# Patient Record
Sex: FEMALE | Race: WHITE | Employment: FULL TIME | URBAN - METROPOLITAN AREA
[De-identification: names, ages, dates, MRNs, and addresses within clinical notes are randomized per-mention and may not be internally consistent; named-entity substitution may affect disease eponyms.]

---

## 2019-08-27 ENCOUNTER — HOSPITAL ENCOUNTER (OUTPATIENT)
Dept: PREADMISSION TESTING | Age: 59
Discharge: HOME | End: 2019-08-31
Payer: MEDICARE

## 2019-08-27 VITALS
TEMPERATURE: 97.9 F | BODY MASS INDEX: 31.8 KG/M2 | HEART RATE: 65 BPM | SYSTOLIC BLOOD PRESSURE: 135 MMHG | DIASTOLIC BLOOD PRESSURE: 87 MMHG | RESPIRATION RATE: 24 BRPM | HEIGHT: 62 IN | OXYGEN SATURATION: 99 % | WEIGHT: 172.8 LBS

## 2019-08-27 LAB
ANION GAP SERPL CALCULATED.3IONS-SCNC: 12 MMOL/L (ref 9–17)
BUN BLDV-MCNC: 24 MG/DL (ref 6–20)
CHLORIDE BLD-SCNC: 103 MMOL/L (ref 98–107)
CO2: 26 MMOL/L (ref 20–31)
CREAT SERPL-MCNC: 1.09 MG/DL (ref 0.5–0.9)
GFR AFRICAN AMERICAN: >60 ML/MIN
GFR NON-AFRICAN AMERICAN: 52 ML/MIN
GFR SERPL CREATININE-BSD FRML MDRD: ABNORMAL ML/MIN/{1.73_M2}
GFR SERPL CREATININE-BSD FRML MDRD: ABNORMAL ML/MIN/{1.73_M2}
GLUCOSE BLD-MCNC: 72 MG/DL (ref 70–99)
HCT VFR BLD CALC: 41.6 % (ref 36.3–47.1)
HEMOGLOBIN: 13.2 G/DL (ref 11.9–15.1)
INR BLD: 1
MCH RBC QN AUTO: 27.3 PG (ref 25.2–33.5)
MCHC RBC AUTO-ENTMCNC: 31.7 G/DL (ref 28.4–34.8)
MCV RBC AUTO: 86.1 FL (ref 82.6–102.9)
NRBC AUTOMATED: 0 PER 100 WBC
PARTIAL THROMBOPLASTIN TIME: 24.1 SEC (ref 20.5–30.5)
PDW BLD-RTO: 14.6 % (ref 11.8–14.4)
PLATELET # BLD: 265 K/UL (ref 138–453)
PMV BLD AUTO: 10.4 FL (ref 8.1–13.5)
POTASSIUM SERPL-SCNC: 4.5 MMOL/L (ref 3.7–5.3)
PROTHROMBIN TIME: 10.2 SEC (ref 9–12)
RBC # BLD: 4.83 M/UL (ref 3.95–5.11)
SODIUM BLD-SCNC: 141 MMOL/L (ref 135–144)
WBC # BLD: 5.7 K/UL (ref 3.5–11.3)

## 2019-08-27 PROCEDURE — 82947 ASSAY GLUCOSE BLOOD QUANT: CPT

## 2019-08-27 PROCEDURE — 85027 COMPLETE CBC AUTOMATED: CPT

## 2019-08-27 PROCEDURE — 85730 THROMBOPLASTIN TIME PARTIAL: CPT

## 2019-08-27 PROCEDURE — 87086 URINE CULTURE/COLONY COUNT: CPT

## 2019-08-27 PROCEDURE — 87186 SC STD MICRODIL/AGAR DIL: CPT

## 2019-08-27 PROCEDURE — 93005 ELECTROCARDIOGRAM TRACING: CPT

## 2019-08-27 PROCEDURE — 87088 URINE BACTERIA CULTURE: CPT

## 2019-08-27 PROCEDURE — 84520 ASSAY OF UREA NITROGEN: CPT

## 2019-08-27 PROCEDURE — 85610 PROTHROMBIN TIME: CPT

## 2019-08-27 PROCEDURE — 36415 COLL VENOUS BLD VENIPUNCTURE: CPT

## 2019-08-27 PROCEDURE — 80051 ELECTROLYTE PANEL: CPT

## 2019-08-27 PROCEDURE — 82565 ASSAY OF CREATININE: CPT

## 2019-08-27 RX ORDER — BUSPIRONE HYDROCHLORIDE 15 MG/1
15 TABLET ORAL 2 TIMES DAILY
COMMUNITY
Start: 2017-02-20

## 2019-08-27 RX ORDER — BUPROPION HYDROCHLORIDE 75 MG/1
150 TABLET ORAL 2 TIMES DAILY
COMMUNITY
Start: 2017-02-20

## 2019-08-27 RX ORDER — ATORVASTATIN CALCIUM 40 MG/1
40 TABLET, FILM COATED ORAL DAILY
COMMUNITY
Start: 2017-02-20

## 2019-08-27 RX ORDER — ARIPIPRAZOLE 5 MG/1
5 TABLET ORAL DAILY
COMMUNITY
Start: 2017-02-20

## 2019-08-27 RX ORDER — AMLODIPINE BESYLATE 5 MG/1
5 TABLET ORAL DAILY
COMMUNITY
Start: 2017-02-20

## 2019-08-27 RX ORDER — OMEPRAZOLE 20 MG/1
40 CAPSULE, DELAYED RELEASE ORAL DAILY
COMMUNITY

## 2019-08-27 RX ORDER — FLUTICASONE PROPIONATE 50 MCG
1 SPRAY, SUSPENSION (ML) NASAL 2 TIMES DAILY
COMMUNITY

## 2019-08-27 RX ORDER — SODIUM CHLORIDE, SODIUM LACTATE, POTASSIUM CHLORIDE, CALCIUM CHLORIDE 600; 310; 30; 20 MG/100ML; MG/100ML; MG/100ML; MG/100ML
1000 INJECTION, SOLUTION INTRAVENOUS CONTINUOUS
Status: CANCELLED | OUTPATIENT
Start: 2019-08-27

## 2019-08-27 NOTE — ANESTHESIA PRE-OP
No    Medical or cardiac clearance ordered:                                         No    Anesthesiologist called:                                                                No    MICHELLE Fitzgerald PA-C  Electronically signed 8/27/2019 at 9:51 AM

## 2019-08-27 NOTE — H&P (VIEW-ONLY)
History and Physical    Pt Name: Lanette Yanez  MRN: [id number]  YOB: 1960  Date of evaluation: 2019  Primary Care Physician: Mack Hensley  Patient evaluated at the request of  Dr. Shmuel Cheng     Reason for evaluation:  LEFT   Kidney stone   SUBJECTIVE:   History of Chief Complaint:      Linda Lambert is a 63 y.o. female   Who was dx with her first kidney stone in 2018  , had a routine  Physical  Exam  with a abdominal x-ray which  Showed a  incidental finding of a LEFT  kidney stone that  is approx 4 cm ,denies any gross hematuria , dysuria and  flank pain. Also denies drinking soda . Past Medical History      has a past medical history of Accelerated essential hypertension, Arthritis, Cancer ([address] Depression, and Hyperlipidemia. Past Surgical History   has a past surgical history that includes Mandible fracture surgery (Bilateral); Ectopic pregnancy surgery; Carpal tunnel release (Bilateral); Dilation and curettage of uterus;  section; Hysterectomy (); joint replacement (Left, ); Colonoscopy; and skin biopsy (Left, ). Medications   Scheduled Meds:  Continuous Infusions:  PRN Meds:. Allergies  is allergic to latex. Family History    family history includes Arthritis in her father and mother; Cancer (age of onset: 48) in her maternal aunt; Depression (age of onset: 25) in an other family member; Diabetes in her mother; High Blood Pressure in her mother; Mental Illness in an other family member; Stroke (age of onset: 79) in her father.     Family Status   Relation Name Status    Mother  Alive    Father     [address] Other  (Not Specified)        SISTER, LEFT BREAST LUMP REMOVED         Social History  Social History     Socioeconomic History    Marital status:      Spouse name: Not on file    Number of children: Not on file    Years of education: Not on file    Highest education level: Not on file   Occupational reveals no adventitious breath sounds. CARDIOVASCULAR: \"Heart sounds are normal.  Regular rate and rhythm without murmur,    ABDOMEN: Bowel sounds are present in all four quadrants      GENATALIA:Deferred. NEUROLOGIC: \"CN II-XII are grossly intact. EXTREMITIES: Pitting edema:  No,  Varicose veins: No     Dorsal pedal/posterior tibial pulses palpable: Yes         Strength:  Normal       There is no problem list on file for this patient. IMPRESSIONS:   1.   Kidney stone   does not have a problem list on file.     [address]  Electronically signed 8/27/2019 at 10:56 AM       Scheduled for:   Cysto ,  LEFT  kidney stone surgery

## 2019-08-28 LAB
CULTURE: ABNORMAL
Lab: ABNORMAL
SPECIMEN DESCRIPTION: ABNORMAL

## 2019-08-30 LAB
EKG ATRIAL RATE: 63 BPM
EKG P AXIS: 13 DEGREES
EKG P-R INTERVAL: 166 MS
EKG Q-T INTERVAL: 420 MS
EKG QRS DURATION: 100 MS
EKG QTC CALCULATION (BAZETT): 429 MS
EKG R AXIS: 15 DEGREES
EKG T AXIS: -20 DEGREES
EKG VENTRICULAR RATE: 63 BPM

## 2019-09-10 ENCOUNTER — HOSPITAL ENCOUNTER (INPATIENT)
Age: 59
LOS: 1 days | Discharge: HOME | DRG: 443 | End: 2019-09-13
Attending: UROLOGY | Admitting: UROLOGY
Payer: MEDICARE

## 2019-09-10 ENCOUNTER — ANESTHESIA (OUTPATIENT)
Dept: OPERATING ROOM | Age: 59
DRG: 443 | End: 2019-09-10
Payer: MEDICARE

## 2019-09-10 ENCOUNTER — ANESTHESIA (OUTPATIENT)
Dept: INTERVENTIONAL RADIOLOGY/VASCULAR | Age: 59
DRG: 443 | End: 2019-09-10
Payer: MEDICARE

## 2019-09-10 ENCOUNTER — ANESTHESIA EVENT (OUTPATIENT)
Dept: OPERATING ROOM | Age: 59
DRG: 443 | End: 2019-09-10
Payer: MEDICARE

## 2019-09-10 ENCOUNTER — APPOINTMENT (OUTPATIENT)
Dept: CT IMAGING | Age: 59
DRG: 443 | End: 2019-09-10
Attending: UROLOGY
Payer: MEDICARE

## 2019-09-10 ENCOUNTER — APPOINTMENT (OUTPATIENT)
Dept: GENERAL RADIOLOGY | Age: 59
DRG: 443 | End: 2019-09-10
Attending: UROLOGY
Payer: MEDICARE

## 2019-09-10 ENCOUNTER — ANESTHESIA EVENT (OUTPATIENT)
Dept: INTERVENTIONAL RADIOLOGY/VASCULAR | Age: 59
DRG: 443 | End: 2019-09-10
Payer: MEDICARE

## 2019-09-10 ENCOUNTER — HOSPITAL ENCOUNTER (OUTPATIENT)
Dept: INTERVENTIONAL RADIOLOGY/VASCULAR | Age: 59
Discharge: HOME | DRG: 443 | End: 2019-09-12
Payer: MEDICARE

## 2019-09-10 VITALS — DIASTOLIC BLOOD PRESSURE: 70 MMHG | OXYGEN SATURATION: 98 % | SYSTOLIC BLOOD PRESSURE: 119 MMHG

## 2019-09-10 VITALS
DIASTOLIC BLOOD PRESSURE: 77 MMHG | OXYGEN SATURATION: 99 % | SYSTOLIC BLOOD PRESSURE: 111 MMHG | RESPIRATION RATE: 14 BRPM

## 2019-09-10 VITALS
OXYGEN SATURATION: 96 % | SYSTOLIC BLOOD PRESSURE: 142 MMHG | RESPIRATION RATE: 14 BRPM | TEMPERATURE: 92.3 F | DIASTOLIC BLOOD PRESSURE: 93 MMHG

## 2019-09-10 DIAGNOSIS — G89.18 POST-OPERATIVE PAIN: Primary | ICD-10-CM

## 2019-09-10 LAB
ANION GAP SERPL CALCULATED.3IONS-SCNC: 10 MMOL/L (ref 9–17)
BUN BLDV-MCNC: 17 MG/DL (ref 6–20)
BUN BLDV-MCNC: 19 MG/DL (ref 6–20)
BUN/CREAT BLD: ABNORMAL (ref 9–20)
CALCIUM SERPL-MCNC: 8.5 MG/DL (ref 8.6–10.4)
CHLORIDE BLD-SCNC: 104 MMOL/L (ref 98–107)
CO2: 25 MMOL/L (ref 20–31)
CREAT SERPL-MCNC: 1.03 MG/DL (ref 0.5–0.9)
CREAT SERPL-MCNC: 1.06 MG/DL (ref 0.5–0.9)
GFR AFRICAN AMERICAN: >60 ML/MIN
GFR AFRICAN AMERICAN: >60 ML/MIN
GFR NON-AFRICAN AMERICAN: 53 ML/MIN
GFR NON-AFRICAN AMERICAN: 55 ML/MIN
GFR SERPL CREATININE-BSD FRML MDRD: ABNORMAL ML/MIN/{1.73_M2}
GLUCOSE BLD-MCNC: 130 MG/DL (ref 70–99)
HCT VFR BLD CALC: 40.9 % (ref 36.3–47.1)
HEMOGLOBIN: 12.6 G/DL (ref 11.9–15.1)
INR BLD: 1
MCH RBC QN AUTO: 26.8 PG (ref 25.2–33.5)
MCHC RBC AUTO-ENTMCNC: 30.8 G/DL (ref 28.4–34.8)
MCV RBC AUTO: 87 FL (ref 82.6–102.9)
NRBC AUTOMATED: 0 PER 100 WBC
PARTIAL THROMBOPLASTIN TIME: 24.8 SEC (ref 20.5–30.5)
PDW BLD-RTO: 15.1 % (ref 11.8–14.4)
PLATELET # BLD: 237 K/UL (ref 138–453)
PMV BLD AUTO: 10.1 FL (ref 8.1–13.5)
POTASSIUM SERPL-SCNC: 4.1 MMOL/L (ref 3.7–5.3)
PROTHROMBIN TIME: 10.7 SEC (ref 9–12)
RBC # BLD: 4.7 M/UL (ref 3.95–5.11)
SODIUM BLD-SCNC: 139 MMOL/L (ref 135–144)
WBC # BLD: 11.4 K/UL (ref 3.5–11.3)

## 2019-09-10 PROCEDURE — 85610 PROTHROMBIN TIME: CPT

## 2019-09-10 PROCEDURE — 6360000002 HC RX W HCPCS: Performed by: SPECIALIST

## 2019-09-10 PROCEDURE — 3600000002 HC SURGERY LEVEL 2 BASE: Performed by: UROLOGY

## 2019-09-10 PROCEDURE — 2709999900 HC NON-CHARGEABLE SUPPLY: Performed by: UROLOGY

## 2019-09-10 PROCEDURE — 6360000002 HC RX W HCPCS: Performed by: RADIOLOGY

## 2019-09-10 PROCEDURE — 3700000000 HC ANESTHESIA ATTENDED CARE: Performed by: UROLOGY

## 2019-09-10 PROCEDURE — 7100000001 HC PACU RECOVERY - ADDTL 15 MIN: Performed by: UROLOGY

## 2019-09-10 PROCEDURE — 80048 BASIC METABOLIC PNL TOTAL CA: CPT

## 2019-09-10 PROCEDURE — 0WHR8YZ INSERTION OF OTHER DEVICE INTO GENITOURINARY TRACT, VIA NATURAL OR ARTIFICIAL OPENING ENDOSCOPIC: ICD-10-PCS | Performed by: STUDENT IN AN ORGANIZED HEALTH CARE EDUCATION/TRAINING PROGRAM

## 2019-09-10 PROCEDURE — BT1F1ZZ FLUOROSCOPY OF LEFT KIDNEY, URETER AND BLADDER USING LOW OSMOLAR CONTRAST: ICD-10-PCS | Performed by: STUDENT IN AN ORGANIZED HEALTH CARE EDUCATION/TRAINING PROGRAM

## 2019-09-10 PROCEDURE — 7100000011 HC PHASE II RECOVERY - ADDTL 15 MIN

## 2019-09-10 PROCEDURE — C1769 GUIDE WIRE: HCPCS

## 2019-09-10 PROCEDURE — C1725 CATH, TRANSLUMIN NON-LASER: HCPCS

## 2019-09-10 PROCEDURE — 74018 RADEX ABDOMEN 1 VIEW: CPT

## 2019-09-10 PROCEDURE — 3700000001 HC ADD 15 MINUTES (ANESTHESIA): Performed by: UROLOGY

## 2019-09-10 PROCEDURE — 82565 ASSAY OF CREATININE: CPT

## 2019-09-10 PROCEDURE — 7100000000 HC PACU RECOVERY - FIRST 15 MIN: Performed by: UROLOGY

## 2019-09-10 PROCEDURE — 3700000001 HC ADD 15 MINUTES (ANESTHESIA)

## 2019-09-10 PROCEDURE — 84520 ASSAY OF UREA NITROGEN: CPT

## 2019-09-10 PROCEDURE — 85027 COMPLETE CBC AUTOMATED: CPT

## 2019-09-10 PROCEDURE — C1769 GUIDE WIRE: HCPCS | Performed by: UROLOGY

## 2019-09-10 PROCEDURE — 2720000010 HC SURG SUPPLY STERILE: Performed by: UROLOGY

## 2019-09-10 PROCEDURE — 2500000003 HC RX 250 WO HCPCS: Performed by: SPECIALIST

## 2019-09-10 PROCEDURE — 2580000003 HC RX 258: Performed by: UROLOGY

## 2019-09-10 PROCEDURE — 2580000003 HC RX 258: Performed by: SPECIALIST

## 2019-09-10 PROCEDURE — 85730 THROMBOPLASTIN TIME PARTIAL: CPT

## 2019-09-10 PROCEDURE — 6360000004 HC RX CONTRAST MEDICATION: Performed by: UROLOGY

## 2019-09-10 PROCEDURE — 3600000014 HC SURGERY LEVEL 4 ADDTL 15MIN: Performed by: UROLOGY

## 2019-09-10 PROCEDURE — 3700000000 HC ANESTHESIA ATTENDED CARE

## 2019-09-10 PROCEDURE — 2580000003 HC RX 258: Performed by: STUDENT IN AN ORGANIZED HEALTH CARE EDUCATION/TRAINING PROGRAM

## 2019-09-10 PROCEDURE — 6360000002 HC RX W HCPCS

## 2019-09-10 PROCEDURE — 0TC44ZZ EXTIRPATION OF MATTER FROM LEFT KIDNEY PELVIS, PERCUTANEOUS ENDOSCOPIC APPROACH: ICD-10-PCS | Performed by: STUDENT IN AN ORGANIZED HEALTH CARE EDUCATION/TRAINING PROGRAM

## 2019-09-10 PROCEDURE — 50433 PLMT NEPHROURETERAL CATHETER: CPT

## 2019-09-10 PROCEDURE — 7100000010 HC PHASE II RECOVERY - FIRST 15 MIN

## 2019-09-10 PROCEDURE — 6360000002 HC RX W HCPCS: Performed by: ANESTHESIOLOGY

## 2019-09-10 PROCEDURE — C1729 CATH, DRAINAGE: HCPCS | Performed by: UROLOGY

## 2019-09-10 PROCEDURE — C2628 CATHETER, OCCLUSION: HCPCS | Performed by: UROLOGY

## 2019-09-10 PROCEDURE — 6370000000 HC RX 637 (ALT 250 FOR IP): Performed by: STUDENT IN AN ORGANIZED HEALTH CARE EDUCATION/TRAINING PROGRAM

## 2019-09-10 PROCEDURE — 6360000002 HC RX W HCPCS: Performed by: STUDENT IN AN ORGANIZED HEALTH CARE EDUCATION/TRAINING PROGRAM

## 2019-09-10 PROCEDURE — 3600000004 HC SURGERY LEVEL 4 BASE: Performed by: UROLOGY

## 2019-09-10 PROCEDURE — G0378 HOSPITAL OBSERVATION PER HR: HCPCS

## 2019-09-10 PROCEDURE — C1894 INTRO/SHEATH, NON-LASER: HCPCS

## 2019-09-10 PROCEDURE — C1726 CATH, BAL DIL, NON-VASCULAR: HCPCS | Performed by: UROLOGY

## 2019-09-10 PROCEDURE — 2709999900 HC NON-CHARGEABLE SUPPLY

## 2019-09-10 PROCEDURE — 3600000012 HC SURGERY LEVEL 2 ADDTL 15MIN: Performed by: UROLOGY

## 2019-09-10 PROCEDURE — C1894 INTRO/SHEATH, NON-LASER: HCPCS | Performed by: UROLOGY

## 2019-09-10 PROCEDURE — C1758 CATHETER, URETERAL: HCPCS | Performed by: UROLOGY

## 2019-09-10 RX ORDER — FENTANYL CITRATE 50 UG/ML
INJECTION, SOLUTION INTRAMUSCULAR; INTRAVENOUS PRN
Status: DISCONTINUED | OUTPATIENT
Start: 2019-09-10 | End: 2019-09-10 | Stop reason: SDUPTHER

## 2019-09-10 RX ORDER — LABETALOL HYDROCHLORIDE 5 MG/ML
5 INJECTION, SOLUTION INTRAVENOUS EVERY 10 MIN PRN
Status: DISCONTINUED | OUTPATIENT
Start: 2019-09-10 | End: 2019-09-10 | Stop reason: HOSPADM

## 2019-09-10 RX ORDER — POTASSIUM CHLORIDE 20 MEQ/1
40 TABLET, EXTENDED RELEASE ORAL PRN
Status: DISCONTINUED | OUTPATIENT
Start: 2019-09-10 | End: 2019-09-13 | Stop reason: HOSPADM

## 2019-09-10 RX ORDER — ONDANSETRON 2 MG/ML
INJECTION INTRAMUSCULAR; INTRAVENOUS PRN
Status: DISCONTINUED | OUTPATIENT
Start: 2019-09-10 | End: 2019-09-10 | Stop reason: SDUPTHER

## 2019-09-10 RX ORDER — PROPOFOL 10 MG/ML
INJECTION, EMULSION INTRAVENOUS PRN
Status: DISCONTINUED | OUTPATIENT
Start: 2019-09-10 | End: 2019-09-10 | Stop reason: SDUPTHER

## 2019-09-10 RX ORDER — CEFAZOLIN SODIUM 2 G/50ML
SOLUTION INTRAVENOUS PRN
Status: DISCONTINUED | OUTPATIENT
Start: 2019-09-10 | End: 2019-09-10 | Stop reason: SDUPTHER

## 2019-09-10 RX ORDER — MORPHINE SULFATE 2 MG/ML
2 INJECTION, SOLUTION INTRAMUSCULAR; INTRAVENOUS EVERY 5 MIN PRN
Status: DISCONTINUED | OUTPATIENT
Start: 2019-09-10 | End: 2019-09-10 | Stop reason: HOSPADM

## 2019-09-10 RX ORDER — SODIUM CHLORIDE, SODIUM LACTATE, POTASSIUM CHLORIDE, CALCIUM CHLORIDE 600; 310; 30; 20 MG/100ML; MG/100ML; MG/100ML; MG/100ML
INJECTION, SOLUTION INTRAVENOUS CONTINUOUS PRN
Status: DISCONTINUED | OUTPATIENT
Start: 2019-09-10 | End: 2019-09-10 | Stop reason: SDUPTHER

## 2019-09-10 RX ORDER — SODIUM CHLORIDE, SODIUM LACTATE, POTASSIUM CHLORIDE, CALCIUM CHLORIDE 600; 310; 30; 20 MG/100ML; MG/100ML; MG/100ML; MG/100ML
INJECTION, SOLUTION INTRAVENOUS CONTINUOUS
Status: DISCONTINUED | OUTPATIENT
Start: 2019-09-10 | End: 2019-09-10

## 2019-09-10 RX ORDER — OXYCODONE HYDROCHLORIDE AND ACETAMINOPHEN 5; 325 MG/1; MG/1
1 TABLET ORAL EVERY 6 HOURS PRN
Qty: 16 TABLET | Refills: 0 | Status: SHIPPED | OUTPATIENT
Start: 2019-09-10 | End: 2019-09-15

## 2019-09-10 RX ORDER — TAMSULOSIN HYDROCHLORIDE 0.4 MG/1
0.4 CAPSULE ORAL DAILY
Qty: 28 CAPSULE | Refills: 0 | Status: SHIPPED | OUTPATIENT
Start: 2019-09-10 | End: 2019-10-08

## 2019-09-10 RX ORDER — ARIPIPRAZOLE 5 MG/1
5 TABLET ORAL DAILY
Status: DISCONTINUED | OUTPATIENT
Start: 2019-09-10 | End: 2019-09-13 | Stop reason: HOSPADM

## 2019-09-10 RX ORDER — MIDAZOLAM HYDROCHLORIDE 1 MG/ML
INJECTION INTRAMUSCULAR; INTRAVENOUS
Status: COMPLETED
Start: 2019-09-10 | End: 2019-09-10

## 2019-09-10 RX ORDER — OXYCODONE HYDROCHLORIDE AND ACETAMINOPHEN 5; 325 MG/1; MG/1
2 TABLET ORAL EVERY 6 HOURS PRN
Status: DISCONTINUED | OUTPATIENT
Start: 2019-09-10 | End: 2019-09-13 | Stop reason: HOSPADM

## 2019-09-10 RX ORDER — ROCURONIUM BROMIDE 10 MG/ML
INJECTION, SOLUTION INTRAVENOUS PRN
Status: DISCONTINUED | OUTPATIENT
Start: 2019-09-10 | End: 2019-09-10 | Stop reason: SDUPTHER

## 2019-09-10 RX ORDER — ASCORBIC ACID 1000 MG
120 TABLET ORAL DAILY
COMMUNITY

## 2019-09-10 RX ORDER — MIDAZOLAM HYDROCHLORIDE 1 MG/ML
INJECTION INTRAMUSCULAR; INTRAVENOUS PRN
Status: DISCONTINUED | OUTPATIENT
Start: 2019-09-10 | End: 2019-09-10 | Stop reason: SDUPTHER

## 2019-09-10 RX ORDER — MAGNESIUM HYDROXIDE 1200 MG/15ML
LIQUID ORAL CONTINUOUS PRN
Status: COMPLETED | OUTPATIENT
Start: 2019-09-10 | End: 2019-09-10

## 2019-09-10 RX ORDER — DOCUSATE SODIUM 100 MG/1
100 CAPSULE, LIQUID FILLED ORAL 2 TIMES DAILY
Status: DISCONTINUED | OUTPATIENT
Start: 2019-09-10 | End: 2019-09-13 | Stop reason: HOSPADM

## 2019-09-10 RX ORDER — DIPHENHYDRAMINE HYDROCHLORIDE 50 MG/ML
12.5 INJECTION INTRAMUSCULAR; INTRAVENOUS
Status: DISCONTINUED | OUTPATIENT
Start: 2019-09-10 | End: 2019-09-10 | Stop reason: HOSPADM

## 2019-09-10 RX ORDER — LIDOCAINE HYDROCHLORIDE 10 MG/ML
INJECTION, SOLUTION EPIDURAL; INFILTRATION; INTRACAUDAL; PERINEURAL PRN
Status: DISCONTINUED | OUTPATIENT
Start: 2019-09-10 | End: 2019-09-10 | Stop reason: SDUPTHER

## 2019-09-10 RX ORDER — FLUTICASONE PROPIONATE 50 MCG
1 SPRAY, SUSPENSION (ML) NASAL 2 TIMES DAILY
Status: DISCONTINUED | OUTPATIENT
Start: 2019-09-10 | End: 2019-09-13 | Stop reason: HOSPADM

## 2019-09-10 RX ORDER — ONDANSETRON 2 MG/ML
4 INJECTION INTRAMUSCULAR; INTRAVENOUS EVERY 6 HOURS PRN
Status: DISCONTINUED | OUTPATIENT
Start: 2019-09-10 | End: 2019-09-13 | Stop reason: HOSPADM

## 2019-09-10 RX ORDER — POTASSIUM CHLORIDE 7.45 MG/ML
10 INJECTION INTRAVENOUS PRN
Status: DISCONTINUED | OUTPATIENT
Start: 2019-09-10 | End: 2019-09-13 | Stop reason: HOSPADM

## 2019-09-10 RX ORDER — ATORVASTATIN CALCIUM 40 MG/1
40 TABLET, FILM COATED ORAL DAILY
Status: DISCONTINUED | OUTPATIENT
Start: 2019-09-10 | End: 2019-09-13 | Stop reason: HOSPADM

## 2019-09-10 RX ORDER — PROPOFOL 10 MG/ML
INJECTION, EMULSION INTRAVENOUS CONTINUOUS PRN
Status: DISCONTINUED | OUTPATIENT
Start: 2019-09-10 | End: 2019-09-10 | Stop reason: SDUPTHER

## 2019-09-10 RX ORDER — GLYCOPYRROLATE 1 MG/5 ML
SYRINGE (ML) INTRAVENOUS PRN
Status: DISCONTINUED | OUTPATIENT
Start: 2019-09-10 | End: 2019-09-10 | Stop reason: SDUPTHER

## 2019-09-10 RX ORDER — OXYCODONE HYDROCHLORIDE AND ACETAMINOPHEN 5; 325 MG/1; MG/1
1 TABLET ORAL EVERY 6 HOURS PRN
Status: DISCONTINUED | OUTPATIENT
Start: 2019-09-10 | End: 2019-09-13 | Stop reason: HOSPADM

## 2019-09-10 RX ORDER — PANTOPRAZOLE SODIUM 40 MG/1
40 TABLET, DELAYED RELEASE ORAL
Status: DISCONTINUED | OUTPATIENT
Start: 2019-09-11 | End: 2019-09-13 | Stop reason: HOSPADM

## 2019-09-10 RX ORDER — CIPROFLOXACIN 2 MG/ML
400 INJECTION, SOLUTION INTRAVENOUS EVERY 12 HOURS
Status: COMPLETED | OUTPATIENT
Start: 2019-09-10 | End: 2019-09-11

## 2019-09-10 RX ORDER — MORPHINE SULFATE 2 MG/ML
2 INJECTION, SOLUTION INTRAMUSCULAR; INTRAVENOUS
Status: DISCONTINUED | OUTPATIENT
Start: 2019-09-10 | End: 2019-09-11

## 2019-09-10 RX ORDER — CIPROFLOXACIN 2 MG/ML
INJECTION, SOLUTION INTRAVENOUS PRN
Status: DISCONTINUED | OUTPATIENT
Start: 2019-09-10 | End: 2019-09-10 | Stop reason: SDUPTHER

## 2019-09-10 RX ORDER — MAGNESIUM OXIDE 400 MG/1
400 TABLET ORAL DAILY
COMMUNITY

## 2019-09-10 RX ORDER — SODIUM CHLORIDE 0.9 % (FLUSH) 0.9 %
10 SYRINGE (ML) INJECTION PRN
Status: DISCONTINUED | OUTPATIENT
Start: 2019-09-10 | End: 2019-09-13 | Stop reason: HOSPADM

## 2019-09-10 RX ORDER — CEFAZOLIN SODIUM 1 G/50ML
1 INJECTION, SOLUTION INTRAVENOUS EVERY 8 HOURS
Status: CANCELLED | OUTPATIENT
Start: 2019-09-10

## 2019-09-10 RX ORDER — MAGNESIUM HYDROXIDE 1200 MG/15ML
LIQUID ORAL PRN
Status: DISCONTINUED | OUTPATIENT
Start: 2019-09-10 | End: 2019-09-10 | Stop reason: ALTCHOICE

## 2019-09-10 RX ORDER — ACETAMINOPHEN 325 MG/1
650 TABLET ORAL EVERY 6 HOURS PRN
Status: DISCONTINUED | OUTPATIENT
Start: 2019-09-10 | End: 2019-09-13 | Stop reason: HOSPADM

## 2019-09-10 RX ORDER — OXYCODONE HYDROCHLORIDE AND ACETAMINOPHEN 5; 325 MG/1; MG/1
1 TABLET ORAL PRN
Status: DISCONTINUED | OUTPATIENT
Start: 2019-09-10 | End: 2019-09-10 | Stop reason: HOSPADM

## 2019-09-10 RX ORDER — SODIUM CHLORIDE, SODIUM LACTATE, POTASSIUM CHLORIDE, CALCIUM CHLORIDE 600; 310; 30; 20 MG/100ML; MG/100ML; MG/100ML; MG/100ML
1000 INJECTION, SOLUTION INTRAVENOUS CONTINUOUS
Status: DISCONTINUED | OUTPATIENT
Start: 2019-09-10 | End: 2019-09-10

## 2019-09-10 RX ORDER — ONDANSETRON 2 MG/ML
4 INJECTION INTRAMUSCULAR; INTRAVENOUS
Status: COMPLETED | OUTPATIENT
Start: 2019-09-10 | End: 2019-09-10

## 2019-09-10 RX ORDER — NEOSTIGMINE METHYLSULFATE 5 MG/5 ML
SYRINGE (ML) INTRAVENOUS PRN
Status: DISCONTINUED | OUTPATIENT
Start: 2019-09-10 | End: 2019-09-10 | Stop reason: SDUPTHER

## 2019-09-10 RX ORDER — SODIUM CHLORIDE 0.9 % (FLUSH) 0.9 %
10 SYRINGE (ML) INJECTION EVERY 12 HOURS SCHEDULED
Status: DISCONTINUED | OUTPATIENT
Start: 2019-09-10 | End: 2019-09-13 | Stop reason: HOSPADM

## 2019-09-10 RX ORDER — ASCORBIC ACID 500 MG
500 TABLET ORAL DAILY
COMMUNITY

## 2019-09-10 RX ORDER — AMLODIPINE BESYLATE 5 MG/1
5 TABLET ORAL DAILY
Status: DISCONTINUED | OUTPATIENT
Start: 2019-09-11 | End: 2019-09-12

## 2019-09-10 RX ORDER — 0.9 % SODIUM CHLORIDE 0.9 %
VIAL (ML) INJECTION PRN
Status: DISCONTINUED | OUTPATIENT
Start: 2019-09-10 | End: 2019-09-10 | Stop reason: ALTCHOICE

## 2019-09-10 RX ORDER — TAMSULOSIN HYDROCHLORIDE 0.4 MG/1
0.4 CAPSULE ORAL DAILY
Status: DISCONTINUED | OUTPATIENT
Start: 2019-09-10 | End: 2019-09-13 | Stop reason: HOSPADM

## 2019-09-10 RX ORDER — OXYCODONE HYDROCHLORIDE AND ACETAMINOPHEN 5; 325 MG/1; MG/1
2 TABLET ORAL PRN
Status: DISCONTINUED | OUTPATIENT
Start: 2019-09-10 | End: 2019-09-10 | Stop reason: HOSPADM

## 2019-09-10 RX ORDER — MAGNESIUM SULFATE 1 G/100ML
1 INJECTION INTRAVENOUS PRN
Status: DISCONTINUED | OUTPATIENT
Start: 2019-09-10 | End: 2019-09-13 | Stop reason: HOSPADM

## 2019-09-10 RX ORDER — DOCUSATE SODIUM 100 MG/1
100 CAPSULE, LIQUID FILLED ORAL 2 TIMES DAILY
Qty: 60 CAPSULE | Refills: 0 | Status: SHIPPED | OUTPATIENT
Start: 2019-09-10 | End: 2019-10-10

## 2019-09-10 RX ORDER — BUPROPION HYDROCHLORIDE 75 MG/1
150 TABLET ORAL 2 TIMES DAILY
Status: DISCONTINUED | OUTPATIENT
Start: 2019-09-10 | End: 2019-09-13 | Stop reason: HOSPADM

## 2019-09-10 RX ORDER — SODIUM CHLORIDE 9 MG/ML
INJECTION, SOLUTION INTRAVENOUS CONTINUOUS
Status: DISCONTINUED | OUTPATIENT
Start: 2019-09-10 | End: 2019-09-11

## 2019-09-10 RX ORDER — BUSPIRONE HYDROCHLORIDE 15 MG/1
15 TABLET ORAL 2 TIMES DAILY
Status: DISCONTINUED | OUTPATIENT
Start: 2019-09-10 | End: 2019-09-13 | Stop reason: HOSPADM

## 2019-09-10 RX ORDER — CIPROFLOXACIN 2 MG/ML
400 INJECTION, SOLUTION INTRAVENOUS
Status: DISCONTINUED | OUTPATIENT
Start: 2019-09-10 | End: 2019-09-10 | Stop reason: SDUPTHER

## 2019-09-10 RX ORDER — CIPROFLOXACIN 2 MG/ML
400 INJECTION, SOLUTION INTRAVENOUS ONCE
Status: COMPLETED | OUTPATIENT
Start: 2019-09-10 | End: 2019-09-10

## 2019-09-10 RX ORDER — SODIUM CHLORIDE 9 MG/ML
INJECTION, SOLUTION INTRAVENOUS CONTINUOUS
Status: DISCONTINUED | OUTPATIENT
Start: 2019-09-10 | End: 2019-09-10

## 2019-09-10 RX ORDER — GLUCOSAMINE/D3/BOSWELLIA SERRA 1500MG-400
1 TABLET ORAL
COMMUNITY

## 2019-09-10 RX ORDER — FENTANYL CITRATE 50 UG/ML
25 INJECTION, SOLUTION INTRAMUSCULAR; INTRAVENOUS EVERY 5 MIN PRN
Status: DISCONTINUED | OUTPATIENT
Start: 2019-09-10 | End: 2019-09-10 | Stop reason: HOSPADM

## 2019-09-10 RX ADMIN — LIDOCAINE HYDROCHLORIDE 50 MG: 10 INJECTION, SOLUTION EPIDURAL; INFILTRATION; INTRACAUDAL; PERINEURAL at 10:39

## 2019-09-10 RX ADMIN — FENTANYL CITRATE 50 MCG: 50 INJECTION INTRAMUSCULAR; INTRAVENOUS at 08:25

## 2019-09-10 RX ADMIN — PHENYLEPHRINE HYDROCHLORIDE 100 MCG: 10 INJECTION INTRAVENOUS at 11:28

## 2019-09-10 RX ADMIN — OXYCODONE HYDROCHLORIDE AND ACETAMINOPHEN 2 TABLET: 5; 325 TABLET ORAL at 20:29

## 2019-09-10 RX ADMIN — MORPHINE SULFATE 2 MG: 2 INJECTION, SOLUTION INTRAMUSCULAR; INTRAVENOUS at 18:46

## 2019-09-10 RX ADMIN — MORPHINE SULFATE 2 MG: 2 INJECTION, SOLUTION INTRAMUSCULAR; INTRAVENOUS at 15:43

## 2019-09-10 RX ADMIN — FENTANYL CITRATE 25 MCG: 50 INJECTION INTRAMUSCULAR; INTRAVENOUS at 13:28

## 2019-09-10 RX ADMIN — ONDANSETRON 4 MG: 2 INJECTION, SOLUTION INTRAMUSCULAR; INTRAVENOUS at 12:15

## 2019-09-10 RX ADMIN — Medication 0.8 MG: at 11:59

## 2019-09-10 RX ADMIN — PHENYLEPHRINE HYDROCHLORIDE 100 MCG: 10 INJECTION INTRAVENOUS at 11:39

## 2019-09-10 RX ADMIN — MIDAZOLAM HYDROCHLORIDE 2 MG: 1 INJECTION, SOLUTION INTRAMUSCULAR; INTRAVENOUS at 08:22

## 2019-09-10 RX ADMIN — FENTANYL CITRATE 50 MCG: 50 INJECTION INTRAMUSCULAR; INTRAVENOUS at 09:26

## 2019-09-10 RX ADMIN — SODIUM CHLORIDE: 9 INJECTION, SOLUTION INTRAVENOUS at 14:19

## 2019-09-10 RX ADMIN — IOVERSOL 32 ML: 741 INJECTION INTRA-ARTERIAL; INTRAVENOUS at 10:05

## 2019-09-10 RX ADMIN — MIDAZOLAM HYDROCHLORIDE 2 MG: 1 INJECTION, SOLUTION INTRAMUSCULAR; INTRAVENOUS at 07:13

## 2019-09-10 RX ADMIN — SODIUM CHLORIDE, POTASSIUM CHLORIDE, SODIUM LACTATE AND CALCIUM CHLORIDE: 600; 310; 30; 20 INJECTION, SOLUTION INTRAVENOUS at 10:30

## 2019-09-10 RX ADMIN — Medication 2 G: at 10:59

## 2019-09-10 RX ADMIN — PROPOFOL 200 MG: 10 INJECTION, EMULSION INTRAVENOUS at 10:39

## 2019-09-10 RX ADMIN — SODIUM CHLORIDE, POTASSIUM CHLORIDE, SODIUM LACTATE AND CALCIUM CHLORIDE: 600; 310; 30; 20 INJECTION, SOLUTION INTRAVENOUS at 08:58

## 2019-09-10 RX ADMIN — Medication 2 G: at 08:28

## 2019-09-10 RX ADMIN — CEFAZOLIN SODIUM 2 G: 2 SOLUTION INTRAVENOUS at 10:59

## 2019-09-10 RX ADMIN — ONDANSETRON 4 MG: 2 INJECTION INTRAMUSCULAR; INTRAVENOUS at 14:19

## 2019-09-10 RX ADMIN — ONDANSETRON 4 MG: 2 INJECTION INTRAMUSCULAR; INTRAVENOUS at 20:42

## 2019-09-10 RX ADMIN — CIPROFLOXACIN 400 MG: 2 INJECTION, SOLUTION INTRAVENOUS at 11:02

## 2019-09-10 RX ADMIN — CIPROFLOXACIN 400 MG: 2 INJECTION, SOLUTION INTRAVENOUS at 23:28

## 2019-09-10 RX ADMIN — FENTANYL CITRATE 25 MCG: 50 INJECTION INTRAMUSCULAR; INTRAVENOUS at 13:23

## 2019-09-10 RX ADMIN — ROCURONIUM BROMIDE 50 MG: 10 INJECTION INTRAVENOUS at 10:39

## 2019-09-10 RX ADMIN — PHENYLEPHRINE HYDROCHLORIDE 100 MCG: 10 INJECTION INTRAVENOUS at 11:45

## 2019-09-10 RX ADMIN — PROPOFOL 120 MCG/KG/MIN: 10 INJECTION, EMULSION INTRAVENOUS at 09:26

## 2019-09-10 RX ADMIN — ONDANSETRON 4 MG: 2 INJECTION INTRAMUSCULAR; INTRAVENOUS at 10:21

## 2019-09-10 RX ADMIN — Medication 4 MG: at 12:01

## 2019-09-10 RX ADMIN — PROPOFOL 120 MCG/KG/MIN: 10 INJECTION, EMULSION INTRAVENOUS at 08:25

## 2019-09-10 RX ADMIN — CIPROFLOXACIN 400 MG: 2 INJECTION, SOLUTION INTRAVENOUS at 08:33

## 2019-09-10 RX ADMIN — FENTANYL CITRATE 50 MCG: 50 INJECTION INTRAMUSCULAR; INTRAVENOUS at 10:39

## 2019-09-10 ASSESSMENT — PULMONARY FUNCTION TESTS
PIF_VALUE: 21
PIF_VALUE: 24
PIF_VALUE: 0
PIF_VALUE: 0
PIF_VALUE: 24
PIF_VALUE: 1
PIF_VALUE: 1
PIF_VALUE: 23
PIF_VALUE: 24
PIF_VALUE: 27
PIF_VALUE: 1
PIF_VALUE: 23
PIF_VALUE: 23
PIF_VALUE: 0
PIF_VALUE: 23
PIF_VALUE: 0
PIF_VALUE: 23
PIF_VALUE: 1
PIF_VALUE: 1
PIF_VALUE: 0
PIF_VALUE: 1
PIF_VALUE: 3
PIF_VALUE: 1
PIF_VALUE: 0
PIF_VALUE: 23
PIF_VALUE: 2
PIF_VALUE: 24
PIF_VALUE: 1
PIF_VALUE: 21
PIF_VALUE: 23
PIF_VALUE: 1
PIF_VALUE: 21
PIF_VALUE: 23
PIF_VALUE: 3
PIF_VALUE: 23
PIF_VALUE: 1
PIF_VALUE: 23
PIF_VALUE: 24
PIF_VALUE: 2
PIF_VALUE: 24
PIF_VALUE: 1
PIF_VALUE: 0
PIF_VALUE: 0
PIF_VALUE: 23
PIF_VALUE: 1
PIF_VALUE: 23
PIF_VALUE: 23
PIF_VALUE: 1
PIF_VALUE: 0
PIF_VALUE: 23
PIF_VALUE: 0
PIF_VALUE: 1
PIF_VALUE: 22
PIF_VALUE: 1
PIF_VALUE: 23
PIF_VALUE: 1
PIF_VALUE: 1
PIF_VALUE: 0
PIF_VALUE: 21
PIF_VALUE: 24
PIF_VALUE: 1
PIF_VALUE: 0
PIF_VALUE: 1
PIF_VALUE: 9
PIF_VALUE: 1
PIF_VALUE: 39
PIF_VALUE: 24
PIF_VALUE: 1
PIF_VALUE: 1
PIF_VALUE: 23
PIF_VALUE: 2
PIF_VALUE: 1
PIF_VALUE: 1
PIF_VALUE: 21
PIF_VALUE: 29
PIF_VALUE: 1
PIF_VALUE: 23
PIF_VALUE: 32
PIF_VALUE: 22
PIF_VALUE: 21
PIF_VALUE: 23
PIF_VALUE: 1
PIF_VALUE: 3
PIF_VALUE: 22
PIF_VALUE: 22
PIF_VALUE: 23
PIF_VALUE: 1
PIF_VALUE: 22
PIF_VALUE: 23
PIF_VALUE: 1
PIF_VALUE: 23
PIF_VALUE: 0
PIF_VALUE: 0
PIF_VALUE: 23
PIF_VALUE: 0
PIF_VALUE: 1
PIF_VALUE: 2
PIF_VALUE: 23
PIF_VALUE: 22
PIF_VALUE: 23
PIF_VALUE: 0
PIF_VALUE: 23
PIF_VALUE: 0
PIF_VALUE: 1
PIF_VALUE: 5
PIF_VALUE: 1
PIF_VALUE: 23
PIF_VALUE: 1
PIF_VALUE: 21
PIF_VALUE: 0
PIF_VALUE: 1
PIF_VALUE: 0
PIF_VALUE: 0
PIF_VALUE: 1
PIF_VALUE: 23
PIF_VALUE: 1
PIF_VALUE: 0
PIF_VALUE: 22
PIF_VALUE: 0
PIF_VALUE: 1
PIF_VALUE: 1
PIF_VALUE: 23
PIF_VALUE: 0
PIF_VALUE: 1
PIF_VALUE: 26
PIF_VALUE: 1
PIF_VALUE: 1
PIF_VALUE: 0
PIF_VALUE: 2
PIF_VALUE: 23
PIF_VALUE: 23
PIF_VALUE: 0
PIF_VALUE: 1
PIF_VALUE: 0
PIF_VALUE: 26
PIF_VALUE: 1
PIF_VALUE: 21
PIF_VALUE: 1

## 2019-09-10 ASSESSMENT — PAIN DESCRIPTION - LOCATION
LOCATION: FLANK
LOCATION: FLANK

## 2019-09-10 ASSESSMENT — PAIN DESCRIPTION - PAIN TYPE
TYPE: SURGICAL PAIN
TYPE: SURGICAL PAIN

## 2019-09-10 ASSESSMENT — PAIN SCALES - GENERAL
PAINLEVEL_OUTOF10: 7
PAINLEVEL_OUTOF10: 6
PAINLEVEL_OUTOF10: 6
PAINLEVEL_OUTOF10: 8
PAINLEVEL_OUTOF10: 0
PAINLEVEL_OUTOF10: 7
PAINLEVEL_OUTOF10: 6

## 2019-09-10 ASSESSMENT — PAIN - FUNCTIONAL ASSESSMENT: PAIN_FUNCTIONAL_ASSESSMENT: 0-10

## 2019-09-10 ASSESSMENT — LIFESTYLE VARIABLES
SMOKING_STATUS: 0

## 2019-09-10 NOTE — ANESTHESIA PRE PROCEDURE
CMP:   Lab Results   Component Value Date     08/27/2019    K 4.5 08/27/2019     08/27/2019    CO2 26 08/27/2019    BUN 24 08/27/2019    CREATININE 1.09 08/27/2019    GFRAA >60 08/27/2019    LABGLOM 52 08/27/2019    GLUCOSE 72 08/27/2019       POC Tests: No results for input(s): POCGLU, POCNA, POCK, POCCL, POCBUN, POCHEMO, POCHCT in the last 72 hours. Coags:   Lab Results   Component Value Date    PROTIME 10.2 08/27/2019    INR 1.0 08/27/2019    APTT 24.1 08/27/2019       HCG (If Applicable): No results found for: PREGTESTUR, PREGSERUM, HCG, HCGQUANT     ABGs: No results found for: PHART, PO2ART, RSE6VMJ, BFD0OGT, BEART, L6OYUXVK     Type & Screen (If Applicable):  No results found for: LABABO, LABRH    Anesthesia Evaluation   no history of anesthetic complications:   Airway: Mallampati: III     Neck ROM: full   Dental:          Pulmonary:       (-) recent URI and not a current smoker                          ROS comment: SONIA not tested   Cardiovascular:    (+) hypertension:,                ROS comment: -cp,syn,pnd,palp     Neuro/Psych:   (+) depression/anxiety    (-) seizures and CVA            ROS comment: anxiety GI/Hepatic/Renal:   (+) GERD: well controlled,           Endo/Other:        (-) diabetes mellitus               Abdominal:           Vascular:                                        Anesthesia Plan      general and MAC     ASA 2     (Asa 2)  Induction: intravenous. Use of blood products discussed with patient whom did not consent to blood products.                    Gregg Belcher MD   9/10/2019

## 2019-09-10 NOTE — ANESTHESIA PRE PROCEDURE
Department of Anesthesiology  Preprocedure Note       Name:  Taz Manuel   Age:  63 y.o.  :  1960                                          MRN:  [id number]         Date:  9/10/2019      Surgeon: Sindy Pozo):  Loren Yancey MD    Procedure: HOLMIUM- NEPHROLITHOTOMY PERCUTANEOUS, C-ARM, LITHOCLAST, PATIENT COMING FROM INTERVENTIONAL RADIOLOGY (Left )    Department of Anesthesiology  Pre-Anesthesia Evaluation/Consultation         Name:  Taz Manuel                                         Age:  63 y.o. MRN:  [id number]             Medications  No current facility-administered medications for this visit. No current outpatient medications on file.      Facility-Administered Medications Ordered in Other Visits   Medication Dose Route Frequency Provider Last Rate Last Dose    0.9 % sodium chloride infusion   Intravenous Continuous Maday Traylor MD        lactated ringers infusion 1,000 mL  1,000 mL Intravenous Continuous Bhavesh Sanders MD        lactated ringers infusion   Intravenous Continuous Gregg Belcher MD        fentaNYL (SUBLIMAZE) injection 25 mcg  25 mcg Intravenous Q5 Min PRN Gregg Belcher MD        morphine (PF) injection 2 mg  2 mg Intravenous Q5 Min PRN Gregg Belcher MD        oxyCODONE-acetaminophen (PERCOCET) 5-325 MG per tablet 1 tablet  1 tablet Oral PRN Gregg Belcher MD        Or    oxyCODONE-acetaminophen (PERCOCET) 5-325 MG per tablet 2 tablet  2 tablet Oral PRN Gregg Belcher MD        diphenhydrAMINE (BENADRYL) injection 12.5 mg  12.5 mg Intravenous Once PRN Gregg Belcher MD        ondansetron [facility]) injection 4 mg  4 mg Intravenous Once PRN Gregg Belcher MD        labetalol (NORMODYNE;TRANDATE) injection 5 mg  5 mg Intravenous Q10 Min PRN Gregg Belcher MD        midazolam (VERSED) injection    PRN Sydney Anabell, APRN - CRNA   2 mg at 09/10/19 9542    fentaNYL (SUBLIMAZE) injection    PRN Sydney Dock, APRN - CRNA   50 mcg at 09/10/19 3404    propofol injection    Continuous PRN Ardelle Rachel, APRN - CRNA 35.1 mL/hr at 09/10/19 0929 75 mcg/kg/min at 09/10/19 0929    lactated ringers infusion    Continuous PRN Ardelle Rachel, APRN - CRNA           Allergies   Allergen Reactions    Latex Rash     There is no problem list on file for this patient. Past Medical History:   Diagnosis Date    Accelerated essential hypertension     TREATED BY KAT GAONA,CULLEN    Arthritis 2017    LEFT HIP REPLACEMENT DR. SANCHEZ    Cancer ([facility] [address] 2015    RIGHT UPPER ARM, DR. BULLOCK    Depression     Hyperlipidemia     TREATED BY Anahi Bailey     Past Surgical History:   Procedure Laterality Date    CARPAL TUNNEL RELEASE Bilateral      SECTION      DR. Sarwat Butts    COLONOSCOPY      DILATION AND CURETTAGE OF UTERUS      3646,8554,     ECTOPIC PREGNANCY SURGERY      8905,5675 (0),8911    HYSTERECTOMY      DR. NIEVES    JOINT REPLACEMENT Left 2017    HIP REPLACEMENT, DR. SANCHEZ    MANDIBLE FRACTURE SURGERY Bilateral     DR TOUSSAINT    SKIN BIOPSY Left     SHOULDER, DR. BULLOCK     Social History     Tobacco Use    Smoking status: Never Smoker    Smokeless tobacco: Never Used   Substance Use Topics    Alcohol use: Yes     Comment: RARE    Drug use: Not on file         Vital Signs (Current)   There were no vitals filed for this visit.   Vital Signs Statistics (for past 48 hrs)     Temp  Av.2 °F (36.2 °C)  Min: 97.2 °F (36.2 °C)   Min taken time: 09/10/19 0639  Max: 97.2 °F (36.2 °C)   Max taken time: 09/10/19 0639  Pulse  Av  Min: 71   Min taken time: 09/10/19 0639  Max: 71   Max taken time: 09/10/19 0639  Resp  Av.5  Min: 0   Min taken time: 09/10/19 0849  Max: 25   Max taken time: 09/10/19 0824  BP  Min: 97/61   Min taken time: 09/10/19 0844  Max: 143/90   Max taken time: 09/10/19 0910  SpO2  Av %  Min: 89 %   Min taken time: 09/10/19 0830  Max: 100 %   Max taken time: 09/10/19 0932  BP Readings from Last 3 Encounters:   09/10/19 (!) mouth daily    Historical Provider, MD   vitamin C (ASCORBIC ACID) 500 MG tablet Take 500 mg by mouth daily Indications: with [place]    Historical Provider, MD   amLODIPine (NORVASC) 5 MG tablet Take 5 mg by mouth daily  2/20/17   Historical Provider, MD   ARIPiprazole (ABILIFY) 5 MG tablet Take 5 mg by mouth daily  2/20/17   Historical Provider, MD   atorvastatin (LIPITOR) 40 MG tablet Take 40 mg by mouth daily  2/20/17   Historical Provider, MD   buPROPion (WELLBUTRIN) 75 MG tablet Take 150 mg by mouth 2 times daily  2/20/17   Historical Provider, MD   busPIRone (BUSPAR) 15 MG tablet Take 15 mg by mouth 2 times daily  2/20/17   Historical Provider, MD   dextromethorphan-quiNIDine (NUEDEXTA) 20-10 MG CAPS per capsule Take 1 capsule by mouth 2 times daily  3/1/17   Historical Provider, MD   fluticasone (FLONASE) 50 MCG/ACT nasal spray 1 spray by Nasal route 2 times daily     Historical Provider, MD   omeprazole (PRILOSEC) 20 MG delayed release capsule Take 40 mg by mouth Daily    Historical Provider, MD       Current medications:    No current facility-administered medications for this visit. No current outpatient medications on file.      Facility-Administered Medications Ordered in Other Visits   Medication Dose Route Frequency Provider Last Rate Last Dose    0.9 % sodium chloride infusion   Intravenous Continuous Sanjay Lundberg MD        lactated ringers infusion 1,000 mL  1,000 mL Intravenous Continuous Rajan Goodwin MD        lactated ringers infusion   Intravenous Continuous Juanita Mosqueda MD        fentaNYL (SUBLIMAZE) injection 25 mcg  25 mcg Intravenous Q5 Min PRN Juanita Mosqueda MD        morphine (PF) injection 2 mg  2 mg Intravenous Q5 Min PRN Juanita Mosqueda MD        oxyCODONE-acetaminophen (PERCOCET) 5-325 MG per tablet 1 tablet  1 tablet Oral PRN Juanita Mosqueda MD        Or    oxyCODONE-acetaminophen (PERCOCET) 5-325 MG per tablet 2 tablet  2 tablet Oral PRN Juanita Mosqueda MD       [facility] Readings from Last 3 Encounters:   09/10/19 (!) 127/92   09/10/19 (!) 121/58   09/10/19 119/70       NPO Status:                                                                                 BMI:   Wt Readings from Last 3 Encounters:   09/10/19 172 lb (78 kg)   08/27/19 172 lb 12.8 oz (78.4 kg)     There is no height or weight on file to calculate BMI.    CBC:   Lab Results   Component Value Date    WBC 5.7 08/27/2019    RBC 4.83 08/27/2019    HGB 13.2 08/27/2019    HCT 41.6 08/27/2019    MCV 86.1 08/27/2019    RDW 14.6 08/27/2019     08/27/2019       CMP:   Lab Results   Component Value Date     08/27/2019    K 4.5 08/27/2019     08/27/2019    CO2 26 08/27/2019    BUN 19 09/10/2019    CREATININE 1.03 09/10/2019    GFRAA >60 09/10/2019    LABGLOM 55 09/10/2019    GLUCOSE 72 08/27/2019       POC Tests: No results for input(s): POCGLU, POCNA, POCK, POCCL, POCBUN, POCHEMO, POCHCT in the last 72 hours.     Coags:   Lab Results   Component Value Date    PROTIME 10.2 08/27/2019    INR 1.0 08/27/2019    APTT 24.1 08/27/2019       HCG (If Applicable): No results found for: PREGTESTUR, PREGSERUM, HCG, HCGQUANT     ABGs: No results found for: PHART, PO2ART, BIN5NET, KKN3PYE, BEART, V5JWKLXW     Type & Screen (If Applicable):  No results found for: LABABO, LABRH    Anesthesia Evaluation   no history of anesthetic complications:   Airway: Mallampati: III     Neck ROM: full   Dental:          Pulmonary:       (-) recent URI and not a current smoker                          ROS comment: SONIA not tested   Cardiovascular:    (+) hypertension:,                ROS comment: -cp,syn,pnd,palp     Neuro/Psych:   (+) depression/anxiety    (-) seizures and CVA            ROS comment: anxiety GI/Hepatic/Renal:   (+) GERD: well controlled,           Endo/Other:        (-) diabetes mellitus               Abdominal:           Vascular:                                          Anesthesia Plan      general and MAC     ASA 2 (Asa 2)  Induction: intravenous. Use of blood products discussed with patient whom did not consent to blood products.                    Jonathan Reynoso MD   9/10/2019

## 2019-09-10 NOTE — ANESTHESIA POSTPROCEDURE EVALUATION
Department of Anesthesiology  Postprocedure Note    Patient: Preet Steward  MRN: [id number]  YOB: 1960  Date of evaluation: 9/10/2019  Time:  11:30 AM     Procedure Summary     Date:  09/10/19 Room / Location:  [facility] OR  / [facility] OR    Anesthesia Start:  7547 Anesthesia Stop:      Procedure:  CYSTOSCOPY, INSERTION OCCLUSIVE BALLOON,  PATIENT TO INTERVENTIONAL RADIOLOGY POST OP- 9AM (Left ) Diagnosis:  (KIDNEY STONE)    Surgeon:  Raine Gould MD Responsible Provider:  Mely Singleton MD    Anesthesia Type:  general, MAC ASA Status:  2          Anesthesia Type: general, MAC    Luli Phase I:      Luli Phase II: Luli Score: 8    Last vitals: Reviewed and per EMR flowsheets.        Anesthesia Post Evaluation    Patient location during evaluation: bedside  Patient participation: complete - patient participated  Level of consciousness: awake and alert  Pain score: 0  Nausea & Vomiting: no vomiting  Cardiovascular status: hemodynamically stable  Respiratory status: nasal cannula

## 2019-09-10 NOTE — PROGRESS NOTES
Physical Therapy  DATE: 9/10/2019    NAME: Griselda Zimmer  MRN: [id number]   : 1960    Patient not seen this date for Physical Therapy due to:  [] Blood transfusion in progress  [] Hemodialysis  []  Patient Declined  [] Spine Precautions   [] Strict Bedrest  [] Surgery/ Procedure  [] Testing      [x] Other, pt just to floor from surgery, nauseated        [] PT being discontinued at this time. Patient independent. No further needs. [] PT being discontinued at this time as the patient has been transferred to palliative care. No further needs.     Antolin Ashraf, PT

## 2019-09-10 NOTE — ANESTHESIA PRE PROCEDURE
2/20/17   Historical Provider, MD   atorvastatin (LIPITOR) 40 MG tablet Take 40 mg by mouth daily  2/20/17   Historical Provider, MD   buPROPion (WELLBUTRIN) 75 MG tablet Take 150 mg by mouth 2 times daily  2/20/17   Historical Provider, MD   busPIRone (BUSPAR) 15 MG tablet Take 15 mg by mouth 2 times daily  2/20/17   Historical Provider, MD   dextromethorphan-quiNIDine (NUEDEXTA) 20-10 MG CAPS per capsule Take 1 capsule by mouth 2 times daily  3/1/17   Historical Provider, MD   fluticasone (FLONASE) 50 MCG/ACT nasal spray 1 spray by Nasal route 2 times daily     Historical Provider, MD   omeprazole (PRILOSEC) 20 MG delayed release capsule Take 40 mg by mouth Daily    Historical Provider, MD       Current medications:    No current facility-administered medications for this visit. No current outpatient medications on file.      Facility-Administered Medications Ordered in Other Visits   Medication Dose Route Frequency Provider Last Rate Last Dose    ciprofloxacin (CIPRO) IVPB 400 mg  400 mg Intravenous Once Esther Collazo MD        0.9 % sodium chloride infusion   Intravenous Continuous Esther Collazo MD        ceFAZolin (ANCEF) 2 g in dextrose 5 % 50 mL IVPB  2 g Intravenous On Call to Hawk Urbano MD        lactated ringers infusion 1,000 mL  1,000 mL Intravenous Continuous Kiley Mclaughlin MD        lactated ringers infusion   Intravenous Continuous Paresh Crowell MD        fentaNYL (SUBLIMAZE) injection 25 mcg  25 mcg Intravenous Q5 Min PRN Paresh Crowell MD        morphine (PF) injection 2 mg  2 mg Intravenous Q5 Min PRN Paresh Crowell MD        oxyCODONE-acetaminophen (PERCOCET) 5-325 MG per tablet 1 tablet  1 tablet Oral PRN Paresh Crowell MD        Or    oxyCODONE-acetaminophen (PERCOCET) 5-325 MG per tablet 2 tablet  2 tablet Oral PRN Paresh Crowell MD        diphenhydrAMINE (BENADRYL) injection 12.5 mg  12.5 mg Intravenous Once PRN Paresh Crowell MD        ondansetron [facility]) injection 4 mg  4 mg Intravenous Once PRN Gregg Belcher MD        labetalol (NORMODYNE;TRANDATE) injection 5 mg  5 mg Intravenous Q10 Min PRN Gregg Belcher MD           Allergies: Allergies   Allergen Reactions    Latex Rash       Problem List:  There is no problem list on file for this patient. Past Medical History:        Diagnosis Date    Accelerated essential hypertension     TREATED BY KAT GAONA,CULLEN    Arthritis 2017    LEFT HIP REPLACEMENT DR. SANCHEZ    Cancer ([facility] [address] 2015    RIGHT UPPER ARM, DR. BULLOCK    Depression     Hyperlipidemia     TREATED BY Shira Vazquez       Past Surgical History:        Procedure Laterality Date    CARPAL TUNNEL RELEASE Bilateral      SECTION      DR. Kimberly Lepe    COLONOSCOPY      DILATION AND CURETTAGE OF UTERUS      5876,4304,     ECTOPIC PREGNANCY SURGERY      0813,0759 (2),5052    HYSTERECTOMY      DR. NIEVES    JOINT REPLACEMENT Left 2017    HIP REPLACEMENT, DR. SANCHEZ    MANDIBLE FRACTURE SURGERY Bilateral     DR TOUSSAINT    SKIN BIOPSY Left     SHOULDER, DR. BULLOCK       Social History:    Social History     Tobacco Use    Smoking status: Never Smoker    Smokeless tobacco: Never Used   Substance Use Topics    Alcohol use: Yes     Comment: RARE                                Counseling given: Not Answered      Vital Signs (Current): There were no vitals filed for this visit.                                            BP Readings from Last 3 Encounters:   09/10/19 (!) 127/92   19 135/87       NPO Status:                                                                                 BMI:   Wt Readings from Last 3 Encounters:   09/10/19 172 lb (78 kg)   19 172 lb 12.8 oz (78.4 kg)     There is no height or weight on file to calculate BMI.    CBC:   Lab Results   Component Value Date    WBC 5.7 2019    RBC 4.83 2019    HGB 13.2 2019    HCT 41.6 2019    MCV 86.1 2019    RDW 14.6 08/27/2019     08/27/2019       CMP:   Lab Results   Component Value Date     08/27/2019    K 4.5 08/27/2019     08/27/2019    CO2 26 08/27/2019    BUN 24 08/27/2019    CREATININE 1.09 08/27/2019    GFRAA >60 08/27/2019    LABGLOM 52 08/27/2019    GLUCOSE 72 08/27/2019       POC Tests: No results for input(s): POCGLU, POCNA, POCK, POCCL, POCBUN, POCHEMO, POCHCT in the last 72 hours. Coags:   Lab Results   Component Value Date    PROTIME 10.2 08/27/2019    INR 1.0 08/27/2019    APTT 24.1 08/27/2019       HCG (If Applicable): No results found for: PREGTESTUR, PREGSERUM, HCG, HCGQUANT     ABGs: No results found for: PHART, PO2ART, KXD9WTC, ZTO8MYZ, BEART, Y0MNWFAI     Type & Screen (If Applicable):  No results found for: LABABO, LABRH    Anesthesia Evaluation   no history of anesthetic complications:   Airway: Mallampati: III     Neck ROM: full   Dental:          Pulmonary:       (-) recent URI and not a current smoker                          ROS comment: SONIA not tested   Cardiovascular:    (+) hypertension:,                ROS comment: -cp,syn,pnd,palp     Neuro/Psych:   (+) depression/anxiety    (-) seizures and CVA            ROS comment: anxiety GI/Hepatic/Renal:   (+) GERD: well controlled,           Endo/Other:        (-) diabetes mellitus               Abdominal:           Vascular:                                          Anesthesia Plan      general and MAC     ASA 2     (Asa 2)  Induction: intravenous. Use of blood products discussed with patient whom did not consent to blood products.                    Shun Botello MD   9/10/2019

## 2019-09-10 NOTE — OP NOTE
Dr. Sonali Isabel MD      [facility]. [facility]. [place]    DATE:  9/10/2019    SURGEON:   Sonali Isabel MD    ASSISTANT:  Margi Boyer MD.      PREOPERATIVE DIAGNOSIS:  LEFT KIDNEY STONE    POSTOPERATIVE DIAGNOSIS:  same    PROCEDURE PERFORMED:  LEFT PERCUTANEOUS NEPHROLITHOTOMY FOR STONE > 2 cm, LEFT NEPHROSTOMY TRACT DILATION,  LEFT ANTEGRADE NEPHROSTOGRAM, PLACEMENT OF NEPHROSTOMY TUBE    ANESTHESIA:  General ET    COMPLICATIONS:  None. ESTIMATED BLOOD LOSS:  50 cc    DRAINS:  malecot catheter  16Fr lowe catheter    SPECIMENS:  Stone for analysis    INDICATIONS FOR PROCEDURE:  The patient is a 63 y.o. female with a history of left kidney stones with total stone burden of 2.5cm. She is a Religious and does not wish to receive any blood products. We discussed this before the procedure. Did have a urine culture that was sensitive to Cipro but not Ancef. The risks and benefits of the procedure as well as possible alternatives and complications were discussed and she consented    DETAILS OF THE PROCEDURE:  The patient underwent balloon catheter placement followed by percutaneous access with interventional radiology. Once this was completed the patient was brought back to the operating room, placed under general endotracheal anesthesia, prepped and draped in the prone position with left side bumped. She was was given appropriate antibiotic re-dosing of 100 mg IV Cipro and 2 g Ancef. A second time out was performed. The percutaneous drain was removed. Then a skin incision was made at the site of the nephrostomy tube and wire. A  dual lumen catheter was placed over the previously placed wire. A second wire was advanced into the ureter as visualized on fluoroscopy. The catheter was removed. The nephromax dilator was advanced into the calyx and inflated to 16mmHg pressure under fluoroscopic guiadnace. The sheath was advanced and the nephroscope was inserted.
patient tolerated the procedure well and was sent to PACU for postoperative monitoring. Access to the left Upper pole or the upper calyx of the mid pole is preferred for access to the collecting system by interventional radiology.

## 2019-09-10 NOTE — ANESTHESIA POSTPROCEDURE EVALUATION
Department of Anesthesiology  Postprocedure Note    Patient: Mariano Dominguez  MRN: [id number]  YOB: 1960  Date of evaluation: 9/10/2019  Time:  3:17 PM     Procedure Summary     Date:  09/10/19 Room / Location:  [facility] OR [address] [place] [address] OR    Anesthesia Start:  1030 Anesthesia Stop:  1233    Procedures:       HOLMIUM- NEPHROLITHOTOMY PERCUTANEOUS, C-ARM, LITHOCLAST, PATIENT COMING FROM INTERVENTIONAL RADIOLOGY (Left Back)      Unlinked Anesthesia Record Diagnosis:  (KIDNEY STONE)    Surgeon:  Luís Headley MD Responsible Provider:  Ramonita Stearns MD    Anesthesia Type:  general, MAC ASA Status:  2          Anesthesia Type: general, MAC    Luli Phase I: Luli Score: 8    Luli Phase II: Luli Score: 8    Last vitals: Reviewed and per EMR flowsheets.        Anesthesia Post Evaluation    Patient location during evaluation: PACU  Patient participation: complete - patient participated  Level of consciousness: awake  Pain score: 6  Nausea & Vomiting: no nausea  Cardiovascular status: hemodynamically stable  Respiratory status: nasal cannula  Hydration status: euvolemic

## 2019-09-11 ENCOUNTER — APPOINTMENT (OUTPATIENT)
Dept: CT IMAGING | Age: 59
DRG: 443 | End: 2019-09-11
Attending: UROLOGY
Payer: MEDICARE

## 2019-09-11 ENCOUNTER — APPOINTMENT (OUTPATIENT)
Dept: INTERVENTIONAL RADIOLOGY/VASCULAR | Age: 59
DRG: 443 | End: 2019-09-11
Attending: UROLOGY
Payer: MEDICARE

## 2019-09-11 ENCOUNTER — APPOINTMENT (OUTPATIENT)
Dept: GENERAL RADIOLOGY | Age: 59
DRG: 443 | End: 2019-09-11
Attending: UROLOGY
Payer: MEDICARE

## 2019-09-11 LAB
ANION GAP SERPL CALCULATED.3IONS-SCNC: 11 MMOL/L (ref 9–17)
BUN BLDV-MCNC: 22 MG/DL (ref 6–20)
BUN/CREAT BLD: ABNORMAL (ref 9–20)
CALCIUM SERPL-MCNC: 7.8 MG/DL (ref 8.6–10.4)
CHLORIDE BLD-SCNC: 103 MMOL/L (ref 98–107)
CO2: 24 MMOL/L (ref 20–31)
CREAT SERPL-MCNC: 1.34 MG/DL (ref 0.5–0.9)
GFR AFRICAN AMERICAN: 49 ML/MIN
GFR NON-AFRICAN AMERICAN: 41 ML/MIN
GFR SERPL CREATININE-BSD FRML MDRD: ABNORMAL ML/MIN/{1.73_M2}
GFR SERPL CREATININE-BSD FRML MDRD: ABNORMAL ML/MIN/{1.73_M2}
GLUCOSE BLD-MCNC: 118 MG/DL (ref 70–99)
HCT VFR BLD CALC: 33.7 % (ref 36.3–47.1)
HCT VFR BLD CALC: 35.6 % (ref 36.3–47.1)
HEMOGLOBIN: 10.2 G/DL (ref 11.9–15.1)
HEMOGLOBIN: 11.1 G/DL (ref 11.9–15.1)
MCH RBC QN AUTO: 26.5 PG (ref 25.2–33.5)
MCHC RBC AUTO-ENTMCNC: 30.3 G/DL (ref 28.4–34.8)
MCV RBC AUTO: 87.5 FL (ref 82.6–102.9)
NRBC AUTOMATED: 0 PER 100 WBC
PDW BLD-RTO: 15.6 % (ref 11.8–14.4)
PLATELET # BLD: 181 K/UL (ref 138–453)
PMV BLD AUTO: 10.4 FL (ref 8.1–13.5)
POTASSIUM SERPL-SCNC: 4.7 MMOL/L (ref 3.7–5.3)
RBC # BLD: 3.85 M/UL (ref 3.95–5.11)
SODIUM BLD-SCNC: 138 MMOL/L (ref 135–144)
WBC # BLD: 14.9 K/UL (ref 3.5–11.3)

## 2019-09-11 PROCEDURE — 36415 COLL VENOUS BLD VENIPUNCTURE: CPT

## 2019-09-11 PROCEDURE — G0378 HOSPITAL OBSERVATION PER HR: HCPCS

## 2019-09-11 PROCEDURE — 0T777DZ DILATION OF LEFT URETER WITH INTRALUMINAL DEVICE, VIA NATURAL OR ARTIFICIAL OPENING: ICD-10-PCS | Performed by: RADIOLOGY

## 2019-09-11 PROCEDURE — 2580000003 HC RX 258: Performed by: STUDENT IN AN ORGANIZED HEALTH CARE EDUCATION/TRAINING PROGRAM

## 2019-09-11 PROCEDURE — 2709999900 HC NON-CHARGEABLE SUPPLY

## 2019-09-11 PROCEDURE — 96375 TX/PRO/DX INJ NEW DRUG ADDON: CPT

## 2019-09-11 PROCEDURE — 96376 TX/PRO/DX INJ SAME DRUG ADON: CPT

## 2019-09-11 PROCEDURE — 6360000002 HC RX W HCPCS: Performed by: STUDENT IN AN ORGANIZED HEALTH CARE EDUCATION/TRAINING PROGRAM

## 2019-09-11 PROCEDURE — 74176 CT ABD & PELVIS W/O CONTRAST: CPT

## 2019-09-11 PROCEDURE — 97530 THERAPEUTIC ACTIVITIES: CPT

## 2019-09-11 PROCEDURE — 50693 PLMT URETERAL STENT PRQ: CPT

## 2019-09-11 PROCEDURE — 85027 COMPLETE CBC AUTOMATED: CPT

## 2019-09-11 PROCEDURE — 6360000002 HC RX W HCPCS: Performed by: RADIOLOGY

## 2019-09-11 PROCEDURE — 85018 HEMOGLOBIN: CPT

## 2019-09-11 PROCEDURE — 71045 X-RAY EXAM CHEST 1 VIEW: CPT

## 2019-09-11 PROCEDURE — 6360000004 HC RX CONTRAST MEDICATION: Performed by: UROLOGY

## 2019-09-11 PROCEDURE — 97161 PT EVAL LOW COMPLEX 20 MIN: CPT

## 2019-09-11 PROCEDURE — 51798 US URINE CAPACITY MEASURE: CPT

## 2019-09-11 PROCEDURE — C1769 GUIDE WIRE: HCPCS

## 2019-09-11 PROCEDURE — 94761 N-INVAS EAR/PLS OXIMETRY MLT: CPT

## 2019-09-11 PROCEDURE — 80048 BASIC METABOLIC PNL TOTAL CA: CPT

## 2019-09-11 PROCEDURE — C2617 STENT, NON-COR, TEM W/O DEL: HCPCS

## 2019-09-11 PROCEDURE — 6370000000 HC RX 637 (ALT 250 FOR IP): Performed by: STUDENT IN AN ORGANIZED HEALTH CARE EDUCATION/TRAINING PROGRAM

## 2019-09-11 PROCEDURE — 96374 THER/PROPH/DIAG INJ IV PUSH: CPT

## 2019-09-11 PROCEDURE — 85014 HEMATOCRIT: CPT

## 2019-09-11 RX ORDER — OXYCODONE HYDROCHLORIDE AND ACETAMINOPHEN 5; 325 MG/1; MG/1
1 TABLET ORAL ONCE
Status: COMPLETED | OUTPATIENT
Start: 2019-09-11 | End: 2019-09-11

## 2019-09-11 RX ORDER — ALBUTEROL SULFATE 2.5 MG/3ML
2.5 SOLUTION RESPIRATORY (INHALATION) EVERY 4 HOURS PRN
Status: DISCONTINUED | OUTPATIENT
Start: 2019-09-11 | End: 2019-09-13 | Stop reason: HOSPADM

## 2019-09-11 RX ORDER — FUROSEMIDE 10 MG/ML
20 INJECTION INTRAMUSCULAR; INTRAVENOUS ONCE
Status: COMPLETED | OUTPATIENT
Start: 2019-09-11 | End: 2019-09-11

## 2019-09-11 RX ORDER — FENTANYL CITRATE 50 UG/ML
INJECTION, SOLUTION INTRAMUSCULAR; INTRAVENOUS
Status: COMPLETED | OUTPATIENT
Start: 2019-09-11 | End: 2019-09-11

## 2019-09-11 RX ORDER — ALBUTEROL SULFATE 2.5 MG/3ML
2.5 SOLUTION RESPIRATORY (INHALATION) 4 TIMES DAILY
Status: DISCONTINUED | OUTPATIENT
Start: 2019-09-12 | End: 2019-09-12

## 2019-09-11 RX ORDER — MIDAZOLAM HYDROCHLORIDE 1 MG/ML
INJECTION INTRAMUSCULAR; INTRAVENOUS
Status: COMPLETED | OUTPATIENT
Start: 2019-09-11 | End: 2019-09-11

## 2019-09-11 RX ORDER — ALBUTEROL SULFATE 2.5 MG/3ML
2.5 SOLUTION RESPIRATORY (INHALATION) 3 TIMES DAILY
Status: DISCONTINUED | OUTPATIENT
Start: 2019-09-11 | End: 2019-09-11

## 2019-09-11 RX ORDER — SODIUM CHLORIDE 9 MG/ML
INJECTION, SOLUTION INTRAVENOUS CONTINUOUS
Status: DISCONTINUED | OUTPATIENT
Start: 2019-09-11 | End: 2019-09-11

## 2019-09-11 RX ADMIN — ALBUTEROL SULFATE 2.5 MG: 2.5 SOLUTION RESPIRATORY (INHALATION) at 20:12

## 2019-09-11 RX ADMIN — MORPHINE SULFATE 2 MG: 2 INJECTION, SOLUTION INTRAMUSCULAR; INTRAVENOUS at 09:19

## 2019-09-11 RX ADMIN — Medication 10 ML: at 22:14

## 2019-09-11 RX ADMIN — OXYCODONE HYDROCHLORIDE AND ACETAMINOPHEN 2 TABLET: 5; 325 TABLET ORAL at 04:24

## 2019-09-11 RX ADMIN — FUROSEMIDE 20 MG: 10 INJECTION, SOLUTION INTRAMUSCULAR; INTRAVENOUS at 22:14

## 2019-09-11 RX ADMIN — IOVERSOL 25 ML: 509 INJECTION INTRA-ARTERIAL; INTRAVENOUS at 15:36

## 2019-09-11 RX ADMIN — FENTANYL CITRATE 50 MCG: 50 INJECTION INTRAMUSCULAR; INTRAVENOUS at 15:21

## 2019-09-11 RX ADMIN — MIDAZOLAM HYDROCHLORIDE 1 MG: 1 INJECTION, SOLUTION INTRAMUSCULAR; INTRAVENOUS at 15:22

## 2019-09-11 RX ADMIN — MORPHINE SULFATE 2 MG: 2 INJECTION, SOLUTION INTRAMUSCULAR; INTRAVENOUS at 15:00

## 2019-09-11 RX ADMIN — OXYCODONE HYDROCHLORIDE AND ACETAMINOPHEN 1 TABLET: 5; 325 TABLET ORAL at 20:48

## 2019-09-11 RX ADMIN — ONDANSETRON 4 MG: 2 INJECTION INTRAMUSCULAR; INTRAVENOUS at 23:47

## 2019-09-11 RX ADMIN — OXYCODONE HYDROCHLORIDE AND ACETAMINOPHEN 2 TABLET: 5; 325 TABLET ORAL at 16:17

## 2019-09-11 RX ADMIN — FENTANYL CITRATE 50 MCG: 50 INJECTION INTRAMUSCULAR; INTRAVENOUS at 15:29

## 2019-09-11 RX ADMIN — CIPROFLOXACIN 400 MG: 2 INJECTION, SOLUTION INTRAVENOUS at 10:54

## 2019-09-11 RX ADMIN — MORPHINE SULFATE 2 MG: 2 INJECTION, SOLUTION INTRAMUSCULAR; INTRAVENOUS at 17:09

## 2019-09-11 RX ADMIN — ONDANSETRON 4 MG: 2 INJECTION INTRAMUSCULAR; INTRAVENOUS at 17:09

## 2019-09-11 ASSESSMENT — PAIN SCALES - GENERAL
PAINLEVEL_OUTOF10: 9
PAINLEVEL_OUTOF10: 8
PAINLEVEL_OUTOF10: 9
PAINLEVEL_OUTOF10: 5
PAINLEVEL_OUTOF10: 10
PAINLEVEL_OUTOF10: 5
PAINLEVEL_OUTOF10: 5
PAINLEVEL_OUTOF10: 4
PAINLEVEL_OUTOF10: 5
PAINLEVEL_OUTOF10: 5

## 2019-09-11 ASSESSMENT — PAIN DESCRIPTION - DESCRIPTORS
DESCRIPTORS: DISCOMFORT
DESCRIPTORS: DISCOMFORT

## 2019-09-11 ASSESSMENT — PAIN DESCRIPTION - PAIN TYPE
TYPE: ACUTE PAIN
TYPE: ACUTE PAIN

## 2019-09-11 ASSESSMENT — PAIN DESCRIPTION - LOCATION
LOCATION: BACK
LOCATION: BACK

## 2019-09-11 NOTE — PROGRESS NOTES
writer's exit  Transfers  Sit to Stand: Independent  Stand to sit: Independent  Ambulation  Ambulation?: Yes  Ambulation 1  Surface: level tile  Device: No Device  Assistance: Supervision(Supervision provided for safety)  Gait Deviations: Slow Patience; Shuffles  Distance: 300ft  Comments: Pt ambulated with steady gait, no LOB noted throughout with mobility. No SOB noted throughout. Pt reports slight pain throughout mobility.    Stairs/Curb  Stairs?: No     Balance  Posture: Good  Sitting - Static: Good  Sitting - Dynamic: Good  Standing - Static: Good  Standing - Dynamic: Good  Comments: Standing balance assessed w/ no AD        Plan   Plan  Plan Comment: D/C Skilled PT  Safety Devices  Type of devices: Gait belt, Left in bed, Call light within reach, Nurse notified  Restraints  Initially in place: No      AM-PAC Score     AM-PAC Inpatient Mobility without Stair Climbing Raw Score : 20 (09/11/19 1457)  AM-PAC Inpatient without Stair Climbing T-Scale Score : 60.57 (09/11/19 1457)  Mobility Inpatient CMS 0-100% Score: 0 (09/11/19 1457)  Mobility Inpatient without Stair CMS G-Code Modifier : 509 [address] (09/11/19 1457)       Therapy Time   Individual Concurrent Group Co-treatment   Time In 1400         Time Out 1417         Minutes 17         Timed Code Treatment Minutes: 8 Minutes       Jeannie Washington, PT

## 2019-09-12 PROBLEM — N20.0 LEFT NEPHROLITHIASIS: Status: ACTIVE | Noted: 2019-09-12

## 2019-09-12 LAB
ANION GAP SERPL CALCULATED.3IONS-SCNC: 13 MMOL/L (ref 9–17)
BUN BLDV-MCNC: 21 MG/DL (ref 6–20)
BUN/CREAT BLD: ABNORMAL (ref 9–20)
CALCIUM SERPL-MCNC: 8.7 MG/DL (ref 8.6–10.4)
CHLORIDE BLD-SCNC: 100 MMOL/L (ref 98–107)
CO2: 25 MMOL/L (ref 20–31)
CREAT SERPL-MCNC: 1.53 MG/DL (ref 0.5–0.9)
GFR AFRICAN AMERICAN: 42 ML/MIN
GFR NON-AFRICAN AMERICAN: 35 ML/MIN
GFR SERPL CREATININE-BSD FRML MDRD: ABNORMAL ML/MIN/{1.73_M2}
GFR SERPL CREATININE-BSD FRML MDRD: ABNORMAL ML/MIN/{1.73_M2}
GLUCOSE BLD-MCNC: 104 MG/DL (ref 70–99)
HCT VFR BLD CALC: 34.8 % (ref 36.3–47.1)
HEMOGLOBIN: 11.1 G/DL (ref 11.9–15.1)
MCH RBC QN AUTO: 27.7 PG (ref 25.2–33.5)
MCHC RBC AUTO-ENTMCNC: 31.9 G/DL (ref 28.4–34.8)
MCV RBC AUTO: 86.8 FL (ref 82.6–102.9)
NRBC AUTOMATED: 0 PER 100 WBC
PDW BLD-RTO: 15.4 % (ref 11.8–14.4)
PLATELET # BLD: 181 K/UL (ref 138–453)
PMV BLD AUTO: 10.6 FL (ref 8.1–13.5)
POTASSIUM SERPL-SCNC: 4.2 MMOL/L (ref 3.7–5.3)
RBC # BLD: 4.01 M/UL (ref 3.95–5.11)
SODIUM BLD-SCNC: 138 MMOL/L (ref 135–144)
WBC # BLD: 13.7 K/UL (ref 3.5–11.3)

## 2019-09-12 PROCEDURE — 85027 COMPLETE CBC AUTOMATED: CPT

## 2019-09-12 PROCEDURE — 99254 IP/OBS CNSLTJ NEW/EST MOD 60: CPT | Performed by: INTERNAL MEDICINE

## 2019-09-12 PROCEDURE — 6360000002 HC RX W HCPCS: Performed by: STUDENT IN AN ORGANIZED HEALTH CARE EDUCATION/TRAINING PROGRAM

## 2019-09-12 PROCEDURE — 6360000002 HC RX W HCPCS: Performed by: UROLOGY

## 2019-09-12 PROCEDURE — 80048 BASIC METABOLIC PNL TOTAL CA: CPT

## 2019-09-12 PROCEDURE — 36415 COLL VENOUS BLD VENIPUNCTURE: CPT

## 2019-09-12 PROCEDURE — 94640 AIRWAY INHALATION TREATMENT: CPT

## 2019-09-12 PROCEDURE — 1200000000 HC SEMI PRIVATE

## 2019-09-12 PROCEDURE — 2580000003 HC RX 258: Performed by: STUDENT IN AN ORGANIZED HEALTH CARE EDUCATION/TRAINING PROGRAM

## 2019-09-12 PROCEDURE — 94761 N-INVAS EAR/PLS OXIMETRY MLT: CPT

## 2019-09-12 PROCEDURE — 6370000000 HC RX 637 (ALT 250 FOR IP): Performed by: STUDENT IN AN ORGANIZED HEALTH CARE EDUCATION/TRAINING PROGRAM

## 2019-09-12 PROCEDURE — 2700000000 HC OXYGEN THERAPY PER DAY

## 2019-09-12 RX ORDER — ALBUTEROL SULFATE 2.5 MG/3ML
2.5 SOLUTION RESPIRATORY (INHALATION) 3 TIMES DAILY
Status: DISCONTINUED | OUTPATIENT
Start: 2019-09-12 | End: 2019-09-13 | Stop reason: HOSPADM

## 2019-09-12 RX ORDER — FUROSEMIDE 10 MG/ML
20 INJECTION INTRAMUSCULAR; INTRAVENOUS ONCE
Status: COMPLETED | OUTPATIENT
Start: 2019-09-12 | End: 2019-09-12

## 2019-09-12 RX ADMIN — AMLODIPINE BESYLATE 5 MG: 5 TABLET ORAL at 08:52

## 2019-09-12 RX ADMIN — DESMOPRESSIN ACETATE 40 MG: 0.2 TABLET ORAL at 08:52

## 2019-09-12 RX ADMIN — MAGNESIUM GLUCONATE 500 MG ORAL TABLET 400 MG: 500 TABLET ORAL at 08:51

## 2019-09-12 RX ADMIN — DEXTROMETHORPHAN HYDROBROMIDE AND QUINIDINE SULFATE 1 CAPSULE: 20; 10 CAPSULE, GELATIN COATED ORAL at 08:51

## 2019-09-12 RX ADMIN — BUSPIRONE HYDROCHLORIDE 15 MG: 15 TABLET ORAL at 21:13

## 2019-09-12 RX ADMIN — ARIPIPRAZOLE 5 MG: 5 TABLET ORAL at 08:52

## 2019-09-12 RX ADMIN — OXYCODONE HYDROCHLORIDE AND ACETAMINOPHEN 2 TABLET: 5; 325 TABLET ORAL at 15:12

## 2019-09-12 RX ADMIN — Medication 10 ML: at 21:13

## 2019-09-12 RX ADMIN — Medication 10 ML: at 08:51

## 2019-09-12 RX ADMIN — TAMSULOSIN HYDROCHLORIDE 0.4 MG: 0.4 CAPSULE ORAL at 08:51

## 2019-09-12 RX ADMIN — DOCUSATE SODIUM 100 MG: 100 CAPSULE, LIQUID FILLED ORAL at 08:51

## 2019-09-12 RX ADMIN — DEXTROMETHORPHAN HYDROBROMIDE AND QUINIDINE SULFATE 1 CAPSULE: 20; 10 CAPSULE, GELATIN COATED ORAL at 22:08

## 2019-09-12 RX ADMIN — FLUTICASONE PROPIONATE 1 SPRAY: 50 SPRAY, METERED NASAL at 08:52

## 2019-09-12 RX ADMIN — ALBUTEROL SULFATE 2.5 MG: 2.5 SOLUTION RESPIRATORY (INHALATION) at 14:20

## 2019-09-12 RX ADMIN — ALBUTEROL SULFATE 2.5 MG: 2.5 SOLUTION RESPIRATORY (INHALATION) at 09:14

## 2019-09-12 RX ADMIN — BUSPIRONE HYDROCHLORIDE 15 MG: 15 TABLET ORAL at 08:51

## 2019-09-12 RX ADMIN — FLUTICASONE PROPIONATE 1 SPRAY: 50 SPRAY, METERED NASAL at 21:11

## 2019-09-12 RX ADMIN — BUPROPION HYDROCHLORIDE 150 MG: 75 TABLET, FILM COATED ORAL at 21:13

## 2019-09-12 RX ADMIN — OXYCODONE HYDROCHLORIDE AND ACETAMINOPHEN 2 TABLET: 5; 325 TABLET ORAL at 21:12

## 2019-09-12 RX ADMIN — DOCUSATE SODIUM 100 MG: 100 CAPSULE, LIQUID FILLED ORAL at 21:12

## 2019-09-12 RX ADMIN — ALBUTEROL SULFATE 2.5 MG: 2.5 SOLUTION RESPIRATORY (INHALATION) at 20:14

## 2019-09-12 RX ADMIN — FUROSEMIDE 20 MG: 10 INJECTION, SOLUTION INTRAMUSCULAR; INTRAVENOUS at 17:02

## 2019-09-12 RX ADMIN — OXYCODONE HYDROCHLORIDE AND ACETAMINOPHEN 2 TABLET: 5; 325 TABLET ORAL at 05:21

## 2019-09-12 RX ADMIN — BUPROPION HYDROCHLORIDE 150 MG: 75 TABLET, FILM COATED ORAL at 08:51

## 2019-09-12 ASSESSMENT — PAIN SCALES - GENERAL
PAINLEVEL_OUTOF10: 7
PAINLEVEL_OUTOF10: 7
PAINLEVEL_OUTOF10: 8

## 2019-09-12 NOTE — CONSULTS
Pulmonary/Critical Care consultation    Patient's name:  Mariano Dominguez  Medical Record Number: [id number]  Patient's account/billing number: [de-identified]  Patient's YOB: 1960  Age: 63 y.o. Date of Admission: 9/10/2019  6:12 AM  Date of Consult: 2019      Primary Care Physician: Lai Cueva      Code Status: Full Code    Reason for consult: Oxygen desaturation, post-op. HISTORY OF PRESENT ILLNESS:   History was obtained from chart review and the patient. Ms. Mariano Dominguez is a 63 y.o. F   Who was diagnosed with a first kidney stone 2018 had a KUB done that showed incidental finding of a left kidney stone approximately 4 cm. Also has past medical history significant for depression, hyperlipidemia, hypertension, hysterectomy, obesity. Admitted to [facility] for PCNL, postoperative day 2 today had left ureteral stent placed in IR yesterday. Denies any history of smoking. Postop has been requiring large amounts of oxygen with oxygen saturation around 90-92%, has received multiple breathing treatments but still saturating around 90%. Chest x-ray postop showing bilateral small pleural effusion and pulmonary vascular congestion. Past Medical History:        Diagnosis Date    Accelerated essential hypertension     TREATED BY KAT GAONA NP    Arthritis 2017    LEFT HIP REPLACEMENT DR. SANCHEZ    Cancer ([facility] [address] 2015    RIGHT UPPER ARM, DR. BULLOCK    Depression     Hyperlipidemia     TREATED BY Aba Shepherd       Past Surgical History:        Procedure Laterality Date    CARPAL TUNNEL RELEASE Bilateral      SECTION      DR. Esteban Harris    COLONOSCOPY      CYSTOSCOPY Left 09/10/2019    CYSTOSCOPY, INSERTION OCCLUSIVE BALLOON    CYSTOSCOPY Left 9/10/2019    CYSTOSCOPY, INSERTION OCCLUSIVE BALLOON,  PATIENT TO INTERVENTIONAL RADIOLOGY POST OP- 9AM performed by Luís Headley MD at [address] [place] [address]

## 2019-09-12 NOTE — PROGRESS NOTES
[address], RCPPatient Assessment complete. Post-operative pain [G89.18] . Vitals:    09/12/19 0917   BP:    Pulse:    Resp:    Temp:    SpO2: 94%   . Patients home meds are   Prior to Admission medications    Medication Sig Start Date End Date Taking? Authorizing Provider   magnesium oxide (MAG-OX) 400 MG tablet Take 400 mg by mouth daily   Yes Historical Provider, MD   Ginkgo Biloba 40 MG TABS Take 120 mg by mouth daily   Yes Historical Provider, MD   TURMERIC CURCUMIN PO Take 1,000 mg by mouth daily   Yes Historical Provider, MD   Biotin 91763 MCG TABS Take 1 tablet by mouth   Yes Historical Provider, MD   calcium-vitamin D (OSCAL) 250-125 MG-UNIT per tablet Take 2 tablets by mouth daily   Yes Historical Provider, MD   vitamin C (ASCORBIC ACID) 500 MG tablet Take 500 mg by mouth daily Indications: with [place]   Yes Historical Provider, MD   oxyCODONE-acetaminophen (PERCOCET) 5-325 MG per tablet Take 1 tablet by mouth every 6 hours as needed for Pain for up to 5 days. Intended supply: 5 days.  Take lowest dose possible to manage pain 9/10/19 9/15/19 Yes Storm Morris MD   docusate sodium (COLACE) 100 MG capsule Take 1 capsule by mouth 2 times daily 9/10/19 10/10/19 Yes Storm Morris MD   tamsulosin [facility]) 0.4 MG capsule Take 1 capsule by mouth daily for 28 days 9/10/19 10/8/19 Yes Storm Morris MD   amLODIPine (NORVASC) 5 MG tablet Take 5 mg by mouth daily  2/20/17  Yes Historical Provider, MD   ARIPiprazole (ABILIFY) 5 MG tablet Take 5 mg by mouth daily  2/20/17  Yes Historical Provider, MD   atorvastatin (LIPITOR) 40 MG tablet Take 40 mg by mouth daily  2/20/17  Yes Historical Provider, MD   buPROPion (WELLBUTRIN) 75 MG tablet Take 150 mg by mouth 2 times daily  2/20/17  Yes Historical Provider, MD   busPIRone (BUSPAR) 15 MG tablet Take 15 mg by mouth 2 times daily  2/20/17  Yes Historical Provider, MD   dextromethorphan-quiNIDine (NUEDEXTA) 20-10 MG CAPS per capsule Take 1 capsule by 70 72 74 76   25 337 352 366 381 396 411 426 441 25 447 0498 72 13 49   65 277 292 306 321 336 351 366 381 65 363 392 421 449 478 507 [phone]   70 269 284 299 314 329 344 359 374 70 353 382 410 439 468 [phone]   75 261 274 289 305 319 334 348 364 75 344 372 400 429 458 487 [phone]   80 253 266 282 296 312 327 342 356 80 335 362 390 419 448 [phone]

## 2019-09-13 VITALS
DIASTOLIC BLOOD PRESSURE: 62 MMHG | TEMPERATURE: 97.8 F | HEIGHT: 62 IN | BODY MASS INDEX: 31.65 KG/M2 | RESPIRATION RATE: 18 BRPM | SYSTOLIC BLOOD PRESSURE: 105 MMHG | HEART RATE: 92 BPM | OXYGEN SATURATION: 92 % | WEIGHT: 172 LBS

## 2019-09-13 LAB
ANION GAP SERPL CALCULATED.3IONS-SCNC: 10 MMOL/L (ref 9–17)
BUN BLDV-MCNC: 19 MG/DL (ref 6–20)
BUN/CREAT BLD: ABNORMAL (ref 9–20)
CALCIUM SERPL-MCNC: 8.5 MG/DL (ref 8.6–10.4)
CHLORIDE BLD-SCNC: 102 MMOL/L (ref 98–107)
CO2: 28 MMOL/L (ref 20–31)
CREAT SERPL-MCNC: 1.1 MG/DL (ref 0.5–0.9)
GFR AFRICAN AMERICAN: >60 ML/MIN
GFR NON-AFRICAN AMERICAN: 51 ML/MIN
GFR SERPL CREATININE-BSD FRML MDRD: ABNORMAL ML/MIN/{1.73_M2}
GFR SERPL CREATININE-BSD FRML MDRD: ABNORMAL ML/MIN/{1.73_M2}
GLUCOSE BLD-MCNC: 95 MG/DL (ref 70–99)
HCT VFR BLD CALC: 30.8 % (ref 36.3–47.1)
HCT VFR BLD CALC: 31.4 % (ref 36.3–47.1)
HEMOGLOBIN: 9.6 G/DL (ref 11.9–15.1)
HEMOGLOBIN: 9.8 G/DL (ref 11.9–15.1)
LV EF: 55 %
LVEF MODALITY: NORMAL
MCH RBC QN AUTO: 27 PG (ref 25.2–33.5)
MCHC RBC AUTO-ENTMCNC: 30.6 G/DL (ref 28.4–34.8)
MCV RBC AUTO: 88.2 FL (ref 82.6–102.9)
NRBC AUTOMATED: 0 PER 100 WBC
PDW BLD-RTO: 15.4 % (ref 11.8–14.4)
PLATELET # BLD: 180 K/UL (ref 138–453)
PMV BLD AUTO: 10.5 FL (ref 8.1–13.5)
POTASSIUM SERPL-SCNC: 3.8 MMOL/L (ref 3.7–5.3)
RBC # BLD: 3.56 M/UL (ref 3.95–5.11)
SODIUM BLD-SCNC: 140 MMOL/L (ref 135–144)
WBC # BLD: 8.9 K/UL (ref 3.5–11.3)

## 2019-09-13 PROCEDURE — 6370000000 HC RX 637 (ALT 250 FOR IP): Performed by: STUDENT IN AN ORGANIZED HEALTH CARE EDUCATION/TRAINING PROGRAM

## 2019-09-13 PROCEDURE — 6360000002 HC RX W HCPCS: Performed by: UROLOGY

## 2019-09-13 PROCEDURE — 85014 HEMATOCRIT: CPT

## 2019-09-13 PROCEDURE — 99254 IP/OBS CNSLTJ NEW/EST MOD 60: CPT | Performed by: INTERNAL MEDICINE

## 2019-09-13 PROCEDURE — 2580000003 HC RX 258: Performed by: STUDENT IN AN ORGANIZED HEALTH CARE EDUCATION/TRAINING PROGRAM

## 2019-09-13 PROCEDURE — 85018 HEMOGLOBIN: CPT

## 2019-09-13 PROCEDURE — 93306 TTE W/DOPPLER COMPLETE: CPT

## 2019-09-13 PROCEDURE — 85027 COMPLETE CBC AUTOMATED: CPT

## 2019-09-13 PROCEDURE — 36415 COLL VENOUS BLD VENIPUNCTURE: CPT

## 2019-09-13 PROCEDURE — 2700000000 HC OXYGEN THERAPY PER DAY

## 2019-09-13 PROCEDURE — 94640 AIRWAY INHALATION TREATMENT: CPT

## 2019-09-13 PROCEDURE — 80048 BASIC METABOLIC PNL TOTAL CA: CPT

## 2019-09-13 PROCEDURE — 94761 N-INVAS EAR/PLS OXIMETRY MLT: CPT

## 2019-09-13 RX ORDER — FUROSEMIDE 20 MG/1
20 TABLET ORAL DAILY
Status: DISCONTINUED | OUTPATIENT
Start: 2019-09-13 | End: 2019-09-13 | Stop reason: HOSPADM

## 2019-09-13 RX ADMIN — TAMSULOSIN HYDROCHLORIDE 0.4 MG: 0.4 CAPSULE ORAL at 09:01

## 2019-09-13 RX ADMIN — DESMOPRESSIN ACETATE 40 MG: 0.2 TABLET ORAL at 09:01

## 2019-09-13 RX ADMIN — PANTOPRAZOLE SODIUM 40 MG: 40 TABLET, DELAYED RELEASE ORAL at 05:48

## 2019-09-13 RX ADMIN — MAGNESIUM GLUCONATE 500 MG ORAL TABLET 400 MG: 500 TABLET ORAL at 09:01

## 2019-09-13 RX ADMIN — Medication 10 ML: at 09:05

## 2019-09-13 RX ADMIN — ARIPIPRAZOLE 5 MG: 5 TABLET ORAL at 09:01

## 2019-09-13 RX ADMIN — BUPROPION HYDROCHLORIDE 150 MG: 75 TABLET, FILM COATED ORAL at 09:01

## 2019-09-13 RX ADMIN — DEXTROMETHORPHAN HYDROBROMIDE AND QUINIDINE SULFATE 1 CAPSULE: 20; 10 CAPSULE, GELATIN COATED ORAL at 09:01

## 2019-09-13 RX ADMIN — OXYCODONE HYDROCHLORIDE AND ACETAMINOPHEN 2 TABLET: 5; 325 TABLET ORAL at 05:48

## 2019-09-13 RX ADMIN — DOCUSATE SODIUM 100 MG: 100 CAPSULE, LIQUID FILLED ORAL at 09:01

## 2019-09-13 RX ADMIN — FLUTICASONE PROPIONATE 1 SPRAY: 50 SPRAY, METERED NASAL at 09:01

## 2019-09-13 RX ADMIN — BUSPIRONE HYDROCHLORIDE 15 MG: 15 TABLET ORAL at 09:01

## 2019-09-13 RX ADMIN — ALBUTEROL SULFATE 2.5 MG: 2.5 SOLUTION RESPIRATORY (INHALATION) at 07:48

## 2019-09-13 RX ADMIN — FUROSEMIDE 20 MG: 20 TABLET ORAL at 13:47

## 2019-09-13 ASSESSMENT — PAIN SCALES - GENERAL: PAINLEVEL_OUTOF10: 6

## 2019-09-13 NOTE — PLAN OF CARE
Problem: Falls - Risk of:  Goal: Will remain free from falls  Description  Will remain free from falls  9/13/2019 0309 by Abebe Alvarado RN  Outcome: Ongoing     Problem: Falls - Risk of:  Goal: Absence of physical injury  Description  Absence of physical injury  9/13/2019 0309 by Abebe Alvarado RN  Outcome: Ongoing

## 2019-09-13 NOTE — PROGRESS NOTES
Abdoul Junior, RCPPatient Assessment complete. Post-operative pain [G89.18]  Left nephrolithiasis [N20.0] . Vitals:    09/13/19 0345   BP: (!) 115/90   Pulse: 90   Resp: 16   Temp: 98.9 °F (37.2 °C)   SpO2: 93%   .  .    RR 16  Breath Sounds: clear  Patient has bilateral pleural effusion  No pulmonary history  No home respiratory meds    · Bronchodilator assessment at level  2  · Hyperinflation assessment at level   · Secretion Management assessment at level    ·   · [x]    Bronchodilator Assessment  BRONCHODILATOR ASSESSMENT SCORE  Score 0 1 2 3 4 5   Breath Sounds   []  Patient Baseline []  No Wheeze good aeration []  Faint, scattered wheezing, good aeration []  Expiratory Wheezing and or moderately diminished []  Insp/Exp wheeze and/or very diminished []  Insp/Exp and/ or marked distress   Respiratory Rate   []  Patient Baseline [x]  Less than 20 [x]  Less than 20 []  20-25 []  Greater than 25 []  Greater than 25   Peak flow % of Pred or PB [x]  NA   []  Greater than 90%  []  81-90% []  71-80% []  Less than or equal to 70%  or unable to perform []  Unable due to Respiratory Distress   Dyspnea re []  Patient Baseline [x]  No SOB [x]  No SOB []  SOB on exertion []  SOB min activity []  At rest/acute   e FEV% Predicted       [x]  NA []  Above 69%  []  Unable []  Above 60-69%  []  Unable []  Above 50-59%  []  Unable []  Above 35-49%  []  Unable []  Less than 35%  []  Unable

## 2019-09-17 NOTE — DISCHARGE SUMMARY
DISCHARGE SUMMARY NOTE:        Patient Identification  PATIENT: Neil Walsh is a 63 y.o. female. MRN: [id number]  :  1960  Admit Date:  9/10/2019  Discharge date:   19                                  Disposition: home  Discharged Condition:  good  Discharge Diagnoses:   Left nephrolithiasis   Post op pain     Consults: Pulmonology     Surgery: HOLMIUM- NEPHROLITHOTOMY PERCUTANEOUS, C-ARM, LITHOCLAST, PATIENT COMING FROM INTERVENTIONAL RADIOLOGY     Patient Instructions: Activity: Per discharge instructions  Diet: As tolerated  Patient told to follow up with Garrett Joe MD in 2 week(s).    Discharge Medications:    [facility] Medication Instructions HTV:[id number]    Printed on:19 7851   Medication Information                      amLODIPine (NORVASC) 5 MG tablet  Take 5 mg by mouth daily              ARIPiprazole (ABILIFY) 5 MG tablet  Take 5 mg by mouth daily              atorvastatin (LIPITOR) 40 MG tablet  Take 40 mg by mouth daily              Biotin 28373 MCG TABS  Take 1 tablet by mouth             buPROPion (WELLBUTRIN) 75 MG tablet  Take 150 mg by mouth 2 times daily              busPIRone (BUSPAR) 15 MG tablet  Take 15 mg by mouth 2 times daily              calcium-vitamin D (OSCAL) 250-125 MG-UNIT per tablet  Take 2 tablets by mouth daily             dextromethorphan-quiNIDine (NUEDEXTA) 20-10 MG CAPS per capsule  Take 1 capsule by mouth 2 times daily              docusate sodium (COLACE) 100 MG capsule  Take 1 capsule by mouth 2 times daily             fluticasone (FLONASE) 50 MCG/ACT nasal spray  1 spray by Nasal route 2 times daily              Ginkgo Biloba 40 MG TABS  Take 120 mg by mouth daily             magnesium oxide (MAG-OX) 400 MG tablet  Take 400 mg by mouth daily             omeprazole (PRILOSEC) 20 MG delayed release capsule  Take 40 mg by mouth Daily             tamsulosin (FLOMAX) 0.4 MG capsule  Take 1 capsule by mouth daily for 28 days

## 2022-05-30 NOTE — PROGRESS NOTES
Pt returning for pregnancy hormone levels after bilateral tube litigation. dimension. No significant pericardial effusion. No obvious intra-cardiac mass or shunt noted. Past Medical History:        Diagnosis Date    Accelerated essential hypertension     TREATED BY KAT GAONA,CULLEN    Arthritis 2017    LEFT HIP REPLACEMENT DR. SANCHEZ    Cancer ([facility] [address] 2015    RIGHT UPPER ARM,  [address]    Depression     Hyperlipidemia     TREATED BY Jayy Lamas       Past Surgical History:        Procedure Laterality Date    CARPAL TUNNEL RELEASE Bilateral      SECTION      DR. Gas[facility]    COLONOSCOPY      CYSTOSCOPY Left 09/10/2019    CYSTOSCOPY, INSERTION OCCLUSIVE BALLOON    CYSTOSCOPY Left 9/10/2019    CYSTOSCOPY, INSERTION OCCLUSIVE BALLOON,  PATIENT TO INTERVENTIONAL RADIOLOGY POST OP- 9AM performed by Garrett Joe MD at [address] [place] [address]      [phone], [facility] PREGNANCY SURGERY      [phone]    HYSTERECTOMY      DR. NIEVES    JOINT REPLACEMENT Left 2017    HIP REPLACEMENT, DR. SANCHEZ    MANDIBLE FRACTURE SURGERY Bilateral     DR TOUSSAINT    NEPHROLITHOTOMY Left 9/10/2019    HOLMIUM- NEPHROLITHOTOMY PERCUTANEOUS, C-ARM, LITHOCLAST, PATIENT COMING FROM INTERVENTIONAL RADIOLOGY performed by Garrett Joe MD at [address] NEPHROSTOMY Left 09/10/2019    HOLMIUM- NEPHROLITHOTOMY PERCUTANEOUS    OTHER SURGICAL HISTORY  09/10/2019    wire to bladder from IR    SKIN BIOPSY Left     SHOULDER, DR. BULLOCK       Allergies: Allergies   Allergen Reactions    Latex Rash         Home Meds:   Prior to Admission medications    Medication Sig Start Date End Date Taking?  Authorizing Provider   magnesium oxide (MAG-OX) 400 MG tablet Take 400 mg by mouth daily   Yes Historical Provider, MD   Ginkgo Biloba 40 MG TABS Take 120 mg by mouth daily   Yes Historical Provider, MD   TURMERIC CURCUMIN PO Take 1,000 mg by mouth daily   Yes Historical Provider, MD   Biotin 44714 MCG TABS Take 1 tablet by mouth   Yes Historical Provider, History:   TOBACCO:   reports that she has never smoked. She has never used smokeless tobacco.  ETOH:   reports that she drinks alcohol. DRUGS:  has no drug history on file. REVIEW OF SYSTEMS:    Review of Systems -   General ROS: Completed and except as mentioned above were negative   Psychological ROS:  Completed and except as mentioned above were negative  Ophthalmic ROS:  Completed and except as mentioned above were negative  ENT ROS:  Completed and except as mentioned above were negative  Allergy and Immunology ROS:  Completed and except as mentioned above were negative  Hematological and Lymphatic ROS:  Completed and except as mentioned above were negative  Endocrine ROS: Completed and except as mentioned above were negative  Breast ROS:  Completed and except as mentioned above were negative  Respiratory ROS:  Completed and except as mentioned above were negative  Cardiovascular ROS:  Completed and except as mentioned above were negative  Gastrointestinal ROS: Completed and except as mentioned above were negative  Genito-Urinary ROS:  Completed and except as mentioned above were negative  Musculoskeletal ROS:  Completed and except as mentioned above were negative  Neurological ROS:  Completed and except as mentioned above were negative  Dermatological ROS:  Completed and except as mentioned above were negative          Physical Exam:    Vitals: /62   Pulse 92   Temp 97.8 °F (36.6 °C)   Resp 18   Ht 5' 2\" (1.575 m)   Wt 172 lb (78 kg)   SpO2 92%   BMI 31.46 kg/m²     Last Body weight:   Wt Readings from Last 3 Encounters:   09/10/19 172 lb (78 kg)   08/27/19 172 lb 12.8 oz (78.4 kg)       Body Mass Index : Body mass index is 31.46 kg/m².       Intake and Output summary:     Intake/Output Summary (Last 24 hours) at 9/13/2019 1224  Last data filed at 9/13/2019 0603  Gross per 24 hour   Intake --   Output 1500 ml   Net -1500 ml       Physical Examination:   PHYSICAL EXAMINATION:  Vitals: 09/12/19 2300 09/13/19 0000 09/13/19 0345 09/13/19 0800   BP: (!) 100/57  (!) 115/90 105/62   Pulse: 93  90 92   Resp: 18  16 18   Temp: 98.7 °F (37.1 °C)  98.9 °F (37.2 °C) 97.8 °F (36.6 °C)   TempSrc: Oral  Axillary    SpO2: 92% 94% 93% 92%   Weight:       Height:         Constitutional: This is a well developed, well nourished,  63y.o. year old female who is alert, oriented, cooperative and in no apparent distress. Head:normocephalic and atraumatic. EENT:   ALBA. No conjunctival injections. Septum was midline, mucosa was without erythema, exudates or cobblestoning. No thrush was noted. Neck: Supple without thyromegaly. No elevated JVP. Trachea was midline. Respiratory: Chest was symmetrical without dullness to percussion. Breath sounds bilaterally were clear to auscultation. There were no wheezes, rhonchi or rales. There is no intercostal retraction or use of accessory muscles. No egophony noted. Cardiovascular: Regular without murmur, clicks, gallops or rubs. Abdomen: Slightly rounded and soft without organomegaly. No rebound tenderness, rigidity or guarding was appreciated. Lymphatic: No lymphadenopathy. Musculoskeletal: Normal curvature of the spine. No gross muscle weakness. Extremities:  No lower extremity edema, ulcerations, tenderness, varicosities or erythema. Muscle size, tone and strength are normal.  No involuntary movements are noted. Skin:  Warm and dry. Good color, turgor and pigmentation. No lesions or scars.   No cyanosis or clubbing  Neurological/Psychiatric: The patient's general behavior, level of consciousness, thought content and emotional status is normal.            Laboratory findings:-    CBC:   Recent Labs     09/13/19  0553 09/13/19  1156   WBC 8.9  --    HGB 9.6* 9.8*     --      BMP:    Recent Labs     09/11/19  0639 09/12/19  0542 09/13/19  0553    138 140   K 4.7 4.2 3.8    100 102   CO2 24 25 28   BUN 22* 21* 19   CREATININE 1.34* 1.53* 1.10*   GLUCOSE 118* 104* 95     S. Calcium:  Recent Labs     09/13/19  0553   CALCIUM 8.5*     S. Ionized Calcium:No results for input(s): IONCA in the last 72 hours. S. Magnesium:No results for input(s): MG in the last 72 hours. S. Phosphorus:No results for input(s): PHOS in the last 72 hours. S. Glucose:No results for input(s): POCGLU in the last 72 hours. Glycosylated hemoglobin A1C: No results for input(s): LABA1C in the last 72 hours. INR:   No results for input(s): INR in the last 72 hours. Hepatic functions: No results for input(s): ALKPHOS, ALT, AST, PROT, BILITOT, BILIDIR, LABALBU in the last 72 hours. Pancreatic functions:No results for input(s): LACTA, AMYLASE in the last 72 hours. S. Lactic Acid: No results for input(s): LACTA in the last 72 hours. Cardiac enzymes:No results for input(s): CKTOTAL, CKMB, CKMBINDEX, TROPONINI in the last 72 hours. BNP:No results for input(s): BNP in the last 72 hours. Lipid profile: No results for input(s): CHOL, TRIG, HDL, LDLCALC in the last 72 hours. Invalid input(s): LDL  Blood Gases: No results found for: PH, PCO2, PO2, HCO3, O2SAT  Thyroid functions: No results found for: TSH         Microbiology:    Cultures during this admission:     Blood cultures:      [] None drawn      [] Negative             []  Positive (Details:  )  Urine Culture:        [] None drawn      [] Negative             []  Positive (Details:  )  Sputum Culture:   [] None drawn       [] Negative             []  Positive (Details:  )            Assessment and Plan     Active Problems:    Post-operative pain    Left nephrolithiasis  Resolved Problems:    * No resolved hospital problems. *        Plan:  1. Improved pain control. 2. Rec starting on BiPAP. 3. Echo obtained. EF 55%. Shared above. 4. Recommend diuresis as tolerated by blood pressure. If Norvasc not needed, will recommend discontinuing it, so that BP better able to tolerate the diuretics.   5. Rec and encourage Incentive spirometry. 6. Monitor strict Is/Os. Monitor and trend creatinine, if continues trending up, will recommend getting nephrology on board as well. 7. Got 1 dose of IV Lasix 20 yesterday. P.o. Lasix 20 mg x 3 days. BMP for follow-up as outpatient. Thank you for having us involved in the care of your patient. Please call us if you have any questions or concerns. Jesus Lopez MD  PGY-3, Internal Medicine  [facility]  Attending Physician Statement  I have discussed the care of [facility], including pertinent history and exam findings,  with the resident. I have seen and examined the patient and the key elements of all parts of the encounter have been performed by me. I agree with the assessment, plan and orders as documented by the resident with additions . Treatment plan Discussed with nursing staff in detail , all questions answered . Electronically signed by Vern Harris MD on   9/13/19 at 6:41 PM    Please note that this chart was generated using voice recognition Dragon dictation software. Although every effort was made to ensure the accuracy of this automated transcription, some errors in transcription may have occurred.

## 2023-09-18 ENCOUNTER — OFFICE VISIT (OUTPATIENT)
Age: 63
End: 2023-09-18

## 2023-09-18 VITALS — DIASTOLIC BLOOD PRESSURE: 88 MMHG | HEART RATE: 82 BPM | SYSTOLIC BLOOD PRESSURE: 143 MMHG

## 2023-09-18 DIAGNOSIS — M46.1 SACROILIITIS (HCC): Primary | ICD-10-CM

## 2023-09-18 RX ORDER — ALBUTEROL SULFATE 90 UG/1
2 AEROSOL, METERED RESPIRATORY (INHALATION) EVERY 6 HOURS PRN
COMMUNITY

## 2023-09-18 NOTE — PROGRESS NOTES
[address] [place] [address] NEUROSURGERY  [address] [place] [address] [place], [address] [place] [address]  Dept: [phone]  Dept Fax: [phone]     Patient:  Jennifer Cole  YOB: 1960  Date: 9/18/23      Chief Complaint   Patient presents with    Follow-up     8 week f/u after PT           HPI:      patient is a 61-year-old female who is undergone previous lower back surgery at the L4-5 level on the left breast.  She has been experiencing discomfort specifically over the right sacroiliac joint region. .  She does not describe any pain coursing more distally into the lower extremity. most recently the patient has been working with physical therapy. With time and therapy she indicates she feels approximately 20 to 30% improved. Her pain continues to be located over the right sacroiliac joint region. The patient indicates that she does have at least 1 month left of aquatic therapy which she feels has been helping. Physical Exam:      BP (!) 143/88   Pulse 82       Examination today demonstrates reproducible discomfort specifically to palpation over the right sacroiliac joint region. She has no tenderness on the left. Hips are nontender. Ken's testing is negative bilaterally. She has normal strength and sensation in both lower extremities. Gait is steady. Studies Review:         Assessment and Plan:     1.  sacroiliitis        Impression     patient is a 61-year-old female who presents with ongoing complaints of discomfort over the right SI joint. she currently is in physical therapy and with time and physical therapy does feel her level of discomfort is improving. Thus at this point time we will have her finish her upcoming 4 weeks of aquatic therapy. I will schedule her to return to my office in 1 month for reevaluation.   If at that time she has ongoing discomfort of significance, we would then give consideration

## 2023-11-22 ENCOUNTER — OFFICE VISIT (OUTPATIENT)
Age: 63
End: 2023-11-22
Payer: COMMERCIAL

## 2023-11-22 DIAGNOSIS — M46.1 SACROILIITIS (HCC): Primary | ICD-10-CM

## 2023-11-22 PROCEDURE — 3017F COLORECTAL CA SCREEN DOC REV: CPT | Performed by: NEUROLOGICAL SURGERY

## 2023-11-22 PROCEDURE — 99212 OFFICE O/P EST SF 10 MIN: CPT | Performed by: NEUROLOGICAL SURGERY

## 2023-11-22 PROCEDURE — G8484 FLU IMMUNIZE NO ADMIN: HCPCS | Performed by: NEUROLOGICAL SURGERY

## 2023-11-22 PROCEDURE — G8427 DOCREV CUR MEDS BY ELIG CLIN: HCPCS | Performed by: NEUROLOGICAL SURGERY

## 2023-11-22 PROCEDURE — 1036F TOBACCO NON-USER: CPT | Performed by: NEUROLOGICAL SURGERY

## 2023-11-22 PROCEDURE — G8421 BMI NOT CALCULATED: HCPCS | Performed by: NEUROLOGICAL SURGERY

## 2023-11-22 NOTE — PROGRESS NOTES
[facility] NEUROSURGERY  [address] [place] [address] Larry. [place], [address] [place] [address]  Dept: [phone]  Dept Fax: [phone]     Patient:  Ulysses Adjutant [address]  YOB: 1960  Date: 11/22/23      Chief Complaint   Patient presents with    Follow-up     1 month f/u after aquatic PT           HPI:     I had the pleasure of seeing this patient back in the office today in follow-up. As you know she is a 58-year-old female who is undergone previous lower back surgery at the L4-5 level in the past.  She was last seen my office with specific complaints of discomfort over the right sacroiliac joint. Has had no discomfort coursing into the lower extremity. The patient has now completed a full course of therapy including water therapy. Despite these efforts she is continue to experience ongoing discomfort specific to the right sacroiliac joint region. She again denies any pain coursing in the buttock, hips or lower extremities. The patient was inquiring about the option of a SI joint fusion given that physical therapy has failed to improve her condition. I performed the specific procedure but I could provide her with appropriate referral to a surgeon who does. We also did discuss the option of treatment through pain management as well. After reviewing all the above information, the patient clearly indicated that she would like to consider SI joint fusion surgery at the present time. Thus I am going to refer her to orthospine for further evaluation and treatment of her pain isolated to the right sacroiliac joint. Physical Exam:      There were no vitals taken for this visit. Assessment and Plan:     1. Sacroiliitis [facility])              Electronically signed by Rosi Martinez MD on 11/22/2023 at 11:03 AM    Please note that this chart was generated using voice recognition Dragon dictation software.   Although every

## 2024-10-03 ENCOUNTER — HOSPITAL ENCOUNTER (OUTPATIENT)
Dept: PREADMISSION TESTING | Age: 64
Discharge: HOME | End: 2024-10-07
Payer: COMMERCIAL

## 2024-10-03 VITALS
RESPIRATION RATE: 20 BRPM | HEIGHT: 61 IN | DIASTOLIC BLOOD PRESSURE: 78 MMHG | WEIGHT: 195 LBS | HEART RATE: 78 BPM | TEMPERATURE: 98.1 F | SYSTOLIC BLOOD PRESSURE: 129 MMHG | BODY MASS INDEX: 36.82 KG/M2 | OXYGEN SATURATION: 95 %

## 2024-10-03 DIAGNOSIS — Z01.818 PRE-OP TESTING: Primary | ICD-10-CM

## 2024-10-03 LAB
ANION GAP SERPL CALCULATED.3IONS-SCNC: 10 MMOL/L (ref 9–16)
BUN SERPL-MCNC: 15 MG/DL (ref 8–23)
CALCIUM SERPL-MCNC: 9.5 MG/DL (ref 8.6–10.4)
CHLORIDE SERPL-SCNC: 103 MMOL/L (ref 98–107)
CO2 SERPL-SCNC: 27 MMOL/L (ref 20–31)
CREAT SERPL-MCNC: 1.3 MG/DL (ref 0.5–0.9)
ERYTHROCYTE [DISTWIDTH] IN BLOOD BY AUTOMATED COUNT: 17 % (ref 11.8–14.4)
GFR, ESTIMATED: 48 ML/MIN/1.73M2
GLUCOSE SERPL-MCNC: 88 MG/DL (ref 74–99)
HCT VFR BLD AUTO: 40.5 % (ref 36.3–47.1)
HGB BLD-MCNC: 12.9 G/DL (ref 11.9–15.1)
MCH RBC QN AUTO: 26.4 PG (ref 25.2–33.5)
MCHC RBC AUTO-ENTMCNC: 31.9 G/DL (ref 28.4–34.8)
MCV RBC AUTO: 82.8 FL (ref 82.6–102.9)
NRBC BLD-RTO: 0 PER 100 WBC
PLATELET # BLD AUTO: 308 K/UL (ref 138–453)
PMV BLD AUTO: 10.5 FL (ref 8.1–13.5)
POTASSIUM SERPL-SCNC: 4.4 MMOL/L (ref 3.7–5.3)
RBC # BLD AUTO: 4.89 M/UL (ref 3.95–5.11)
SODIUM SERPL-SCNC: 140 MMOL/L (ref 136–145)
WBC OTHER # BLD: 7.1 K/UL (ref 3.5–11.3)

## 2024-10-03 PROCEDURE — 36415 COLL VENOUS BLD VENIPUNCTURE: CPT

## 2024-10-03 PROCEDURE — 85027 COMPLETE CBC AUTOMATED: CPT

## 2024-10-03 PROCEDURE — 80048 BASIC METABOLIC PNL TOTAL CA: CPT

## 2024-10-03 RX ORDER — IPRATROPIUM BROMIDE 21 UG/1
2 SPRAY, METERED NASAL EVERY 12 HOURS
COMMUNITY

## 2024-10-03 RX ORDER — AZELASTINE 1 MG/ML
1 SPRAY, METERED NASAL 2 TIMES DAILY
COMMUNITY

## 2024-10-03 RX ORDER — GINSENG 100 MG
50 CAPSULE ORAL DAILY
COMMUNITY

## 2024-10-03 RX ORDER — SPIRONOLACTONE 50 MG/1
50 TABLET, FILM COATED ORAL DAILY
COMMUNITY

## 2024-10-03 ASSESSMENT — PAIN DESCRIPTION - LOCATION: LOCATION: BACK

## 2024-10-03 ASSESSMENT — PAIN SCALES - GENERAL: PAINLEVEL_OUTOF10: 3

## 2024-10-03 NOTE — DISCHARGE INSTRUCTIONS
DAY OF SURGERY/PROCEDURE  GUIDELINES    As a patient at the [facility], you can expect quality medical and nursing care that is centered on your individual needs. It is our goal to make your surgical experience as comfortable and excellent as possible.  ________________________________________________________________________    The following instructions are general guidelines, if any information on this sheet is different from what your doctor has instructed you to do, please follow your doctor's instructions.    Please arrive on 10/17 @ 930 am      Enter through entrance C. Check in at registration     Upon arrival you will be taken to the pre-operative area to get ready for surgery, your family will stay in the waiting room and visit with you once you are ready for surgery. Due to special limitations please limit visitation to 1-2 members of your family at a time. When it is time for surgery your family will return to the waiting room.    Nothing to eat, drink, smoke, suck or chew after midnight (no water, gum, mints, cigarettes, cigars, pipes, snuff, chewing tobacco, etc.) or your surgery may be canceled.     Take a shower or bath on the morning of your surgery/procedure (Hibiclens if directed) Do not apply any lotions.    Brush your teeth, but do not swallow any water    IN CASE OF ILLNESS - If you have a cold or flu symptoms (high fever, runny nose, sore throat, cough, etc.) rash, nausea, vomiting, loose stools, and/or recent contact with someone who has a contagious disease (chick pox, measles, etc.) please call your doctor before coming to the surgery center    Take a small sip of water with heart, blood pressure, and/or seizure medication the morning of surgery. Amlodipine, Flovent inhaler     If applicable bring your:  Inhaler (s)  Hearing aid(s)  Eyeglasses and Case (If you wear contacts they have to be removed before surgery, bring case and solution)    DO NOT take anticoagulants

## 2024-10-04 ENCOUNTER — ANESTHESIA EVENT (OUTPATIENT)
Dept: OPERATING ROOM | Age: 64
End: 2024-10-04
Payer: COMMERCIAL

## 2024-10-04 NOTE — PROGRESS NOTES
Results of   preop testing labs CBC, BMP faxed to pcp office for use with requested medical clearance for case here 10-17-24. Dr. Beltran office notified that medical clearance has been requested

## 2024-10-17 ENCOUNTER — ANESTHESIA (OUTPATIENT)
Dept: OPERATING ROOM | Age: 64
End: 2024-10-17
Payer: COMMERCIAL

## 2024-12-05 ENCOUNTER — APPOINTMENT (OUTPATIENT)
Dept: GENERAL RADIOLOGY | Age: 64
End: 2024-12-05
Attending: ORTHOPAEDIC SURGERY
Payer: COMMERCIAL

## 2024-12-05 ENCOUNTER — HOSPITAL ENCOUNTER (OUTPATIENT)
Age: 64
Discharge: HOME | End: 2024-12-07
Attending: ORTHOPAEDIC SURGERY | Admitting: ORTHOPAEDIC SURGERY
Payer: COMMERCIAL

## 2024-12-05 DIAGNOSIS — G89.18 POST-OPERATIVE PAIN: Primary | ICD-10-CM

## 2024-12-05 PROCEDURE — 72020 X-RAY EXAM OF SPINE 1 VIEW: CPT

## 2024-12-05 PROCEDURE — 6370000000 HC RX 637 (ALT 250 FOR IP): Performed by: ORTHOPAEDIC SURGERY

## 2024-12-05 PROCEDURE — 6360000002 HC RX W HCPCS: Performed by: ORTHOPAEDIC SURGERY

## 2024-12-05 PROCEDURE — 6360000002 HC RX W HCPCS: Performed by: NURSE ANESTHETIST, CERTIFIED REGISTERED

## 2024-12-05 PROCEDURE — 2500000003 HC RX 250 WO HCPCS: Performed by: NURSE ANESTHETIST, CERTIFIED REGISTERED

## 2024-12-05 PROCEDURE — C1713 ANCHOR/SCREW BN/BN,TIS/BN: HCPCS | Performed by: ORTHOPAEDIC SURGERY

## 2024-12-05 PROCEDURE — 94761 N-INVAS EAR/PLS OXIMETRY MLT: CPT

## 2024-12-05 PROCEDURE — 72100 X-RAY EXAM L-S SPINE 2/3 VWS: CPT

## 2024-12-05 PROCEDURE — 2500000003 HC RX 250 WO HCPCS: Performed by: ORTHOPAEDIC SURGERY

## 2024-12-05 PROCEDURE — 3700000000 HC ANESTHESIA ATTENDED CARE: Performed by: ORTHOPAEDIC SURGERY

## 2024-12-05 PROCEDURE — 7100000000 HC PACU RECOVERY - FIRST 15 MIN: Performed by: ORTHOPAEDIC SURGERY

## 2024-12-05 PROCEDURE — 3600000005 HC SURGERY LEVEL 5 BASE: Performed by: ORTHOPAEDIC SURGERY

## 2024-12-05 PROCEDURE — 6360000002 HC RX W HCPCS: Performed by: ANESTHESIOLOGY

## 2024-12-05 PROCEDURE — 6370000000 HC RX 637 (ALT 250 FOR IP)

## 2024-12-05 PROCEDURE — 3600000015 HC SURGERY LEVEL 5 ADDTL 15MIN: Performed by: ORTHOPAEDIC SURGERY

## 2024-12-05 PROCEDURE — 6360000002 HC RX W HCPCS

## 2024-12-05 PROCEDURE — 2720000010 HC SURG SUPPLY STERILE: Performed by: ORTHOPAEDIC SURGERY

## 2024-12-05 PROCEDURE — 2709999900 HC NON-CHARGEABLE SUPPLY: Performed by: ORTHOPAEDIC SURGERY

## 2024-12-05 PROCEDURE — 2580000003 HC RX 258: Performed by: ANESTHESIOLOGY

## 2024-12-05 PROCEDURE — 1200000000 HC SEMI PRIVATE

## 2024-12-05 PROCEDURE — 7100000001 HC PACU RECOVERY - ADDTL 15 MIN: Performed by: ORTHOPAEDIC SURGERY

## 2024-12-05 PROCEDURE — 3700000001 HC ADD 15 MINUTES (ANESTHESIA): Performed by: ORTHOPAEDIC SURGERY

## 2024-12-05 PROCEDURE — 94640 AIRWAY INHALATION TREATMENT: CPT

## 2024-12-05 DEVICE — 5.5MM CURVED ROD, 40MM
Type: IMPLANTABLE DEVICE | Site: SPINE LUMBAR | Status: FUNCTIONAL
Brand: REVERE

## 2024-12-05 DEVICE — DBM T43105 5CC GRAFTON PUTTY
Type: IMPLANTABLE DEVICE | Site: SPINE LUMBAR | Status: FUNCTIONAL
Brand: GRAFTON®AND GRAFTON PLUS®DEMINERALIZED BONE MATRIX (DBM)

## 2024-12-05 DEVICE — 5.5MM REVERE PEDICLE SCREW 45MM
Type: IMPLANTABLE DEVICE | Site: SPINE LUMBAR | Status: FUNCTIONAL
Brand: REVERE

## 2024-12-05 DEVICE — REVERE LOCKING CAP
Type: IMPLANTABLE DEVICE | Site: SPINE LUMBAR | Status: FUNCTIONAL
Brand: REVERE

## 2024-12-05 RX ORDER — IPRATROPIUM BROMIDE 21 UG/1
2 SPRAY, METERED NASAL 2 TIMES DAILY
Status: DISCONTINUED | OUTPATIENT
Start: 2024-12-05 | End: 2024-12-07 | Stop reason: HOSPADM

## 2024-12-05 RX ORDER — ROCURONIUM BROMIDE 10 MG/ML
INJECTION, SOLUTION INTRAVENOUS
Status: DISCONTINUED | OUTPATIENT
Start: 2024-12-05 | End: 2024-12-05 | Stop reason: SDUPTHER

## 2024-12-05 RX ORDER — SODIUM CHLORIDE 9 MG/ML
INJECTION, SOLUTION INTRAVENOUS CONTINUOUS
Status: DISCONTINUED | OUTPATIENT
Start: 2024-12-05 | End: 2024-12-05

## 2024-12-05 RX ORDER — ONDANSETRON 2 MG/ML
INJECTION INTRAMUSCULAR; INTRAVENOUS
Status: DISCONTINUED | OUTPATIENT
Start: 2024-12-05 | End: 2024-12-05 | Stop reason: SDUPTHER

## 2024-12-05 RX ORDER — SODIUM CHLORIDE 9 MG/ML
INJECTION, SOLUTION INTRAVENOUS PRN
Status: DISCONTINUED | OUTPATIENT
Start: 2024-12-05 | End: 2024-12-05

## 2024-12-05 RX ORDER — MAGNESIUM SULFATE 1 G/100ML
INJECTION INTRAVENOUS
Status: COMPLETED
Start: 2024-12-05 | End: 2024-12-05

## 2024-12-05 RX ORDER — SODIUM CHLORIDE 0.9 % (FLUSH) 0.9 %
5-40 SYRINGE (ML) INJECTION PRN
Status: DISCONTINUED | OUTPATIENT
Start: 2024-12-05 | End: 2024-12-05

## 2024-12-05 RX ORDER — AZELASTINE 1 MG/ML
1 SPRAY, METERED NASAL 2 TIMES DAILY
Status: DISCONTINUED | OUTPATIENT
Start: 2024-12-05 | End: 2024-12-07 | Stop reason: HOSPADM

## 2024-12-05 RX ORDER — LABETALOL HYDROCHLORIDE 5 MG/ML
10 INJECTION, SOLUTION INTRAVENOUS
Status: DISCONTINUED | OUTPATIENT
Start: 2024-12-05 | End: 2024-12-05

## 2024-12-05 RX ORDER — NALOXONE HYDROCHLORIDE 0.4 MG/ML
INJECTION, SOLUTION INTRAMUSCULAR; INTRAVENOUS; SUBCUTANEOUS PRN
Status: DISCONTINUED | OUTPATIENT
Start: 2024-12-05 | End: 2024-12-05

## 2024-12-05 RX ORDER — FLUTICASONE PROPIONATE 50 MCG
1 SPRAY, SUSPENSION (ML) NASAL 2 TIMES DAILY PRN
Status: DISCONTINUED | OUTPATIENT
Start: 2024-12-05 | End: 2024-12-07 | Stop reason: HOSPADM

## 2024-12-05 RX ORDER — METOCLOPRAMIDE HYDROCHLORIDE 5 MG/ML
10 INJECTION INTRAMUSCULAR; INTRAVENOUS
Status: DISCONTINUED | OUTPATIENT
Start: 2024-12-05 | End: 2024-12-05

## 2024-12-05 RX ORDER — ROSUVASTATIN CALCIUM 20 MG/1
20 TABLET, COATED ORAL NIGHTLY
Status: DISCONTINUED | OUTPATIENT
Start: 2024-12-05 | End: 2024-12-07 | Stop reason: HOSPADM

## 2024-12-05 RX ORDER — HYDRALAZINE HYDROCHLORIDE 20 MG/ML
10 INJECTION INTRAMUSCULAR; INTRAVENOUS
Status: DISCONTINUED | OUTPATIENT
Start: 2024-12-05 | End: 2024-12-05

## 2024-12-05 RX ORDER — TAMSULOSIN HYDROCHLORIDE 0.4 MG/1
0.4 CAPSULE ORAL DAILY
Status: DISCONTINUED | OUTPATIENT
Start: 2024-12-05 | End: 2024-12-05

## 2024-12-05 RX ORDER — FENTANYL CITRATE 50 UG/ML
INJECTION, SOLUTION INTRAMUSCULAR; INTRAVENOUS
Status: DISCONTINUED | OUTPATIENT
Start: 2024-12-05 | End: 2024-12-05 | Stop reason: SDUPTHER

## 2024-12-05 RX ORDER — SODIUM CHLORIDE, SODIUM LACTATE, POTASSIUM CHLORIDE, CALCIUM CHLORIDE 600; 310; 30; 20 MG/100ML; MG/100ML; MG/100ML; MG/100ML
INJECTION, SOLUTION INTRAVENOUS CONTINUOUS
Status: DISCONTINUED | OUTPATIENT
Start: 2024-12-05 | End: 2024-12-05

## 2024-12-05 RX ORDER — MORPHINE SULFATE 2 MG/ML
2 INJECTION, SOLUTION INTRAMUSCULAR; INTRAVENOUS EVERY 5 MIN PRN
Status: DISCONTINUED | OUTPATIENT
Start: 2024-12-05 | End: 2024-12-05

## 2024-12-05 RX ORDER — MAGNESIUM SULFATE 1 G/100ML
INJECTION INTRAVENOUS
Status: DISPENSED
Start: 2024-12-05 | End: 2024-12-05

## 2024-12-05 RX ORDER — MAGNESIUM SULFATE 1 G/100ML
INJECTION INTRAVENOUS
Status: DISCONTINUED | OUTPATIENT
Start: 2024-12-05 | End: 2024-12-05 | Stop reason: SDUPTHER

## 2024-12-05 RX ORDER — PHENYLEPHRINE HCL IN 0.9% NACL 1 MG/10 ML
SYRINGE (ML) INTRAVENOUS
Status: DISCONTINUED | OUTPATIENT
Start: 2024-12-05 | End: 2024-12-05 | Stop reason: SDUPTHER

## 2024-12-05 RX ORDER — DIPHENHYDRAMINE HYDROCHLORIDE 50 MG/ML
12.5 INJECTION INTRAMUSCULAR; INTRAVENOUS
Status: DISCONTINUED | OUTPATIENT
Start: 2024-12-05 | End: 2024-12-05

## 2024-12-05 RX ORDER — ALBUTEROL SULFATE 90 UG/1
2 INHALANT RESPIRATORY (INHALATION) EVERY 6 HOURS PRN
Status: DISCONTINUED | OUTPATIENT
Start: 2024-12-05 | End: 2024-12-07 | Stop reason: HOSPADM

## 2024-12-05 RX ORDER — SODIUM CHLORIDE 0.9 % (FLUSH) 0.9 %
5-40 SYRINGE (ML) INJECTION EVERY 12 HOURS SCHEDULED
Status: DISCONTINUED | OUTPATIENT
Start: 2024-12-05 | End: 2024-12-05

## 2024-12-05 RX ORDER — KETOROLAC TROMETHAMINE 30 MG/ML
INJECTION, SOLUTION INTRAMUSCULAR; INTRAVENOUS
Status: DISCONTINUED | OUTPATIENT
Start: 2024-12-05 | End: 2024-12-05 | Stop reason: SDUPTHER

## 2024-12-05 RX ORDER — LIDOCAINE HYDROCHLORIDE 10 MG/ML
INJECTION, SOLUTION EPIDURAL; INFILTRATION; INTRACAUDAL; PERINEURAL
Status: DISCONTINUED | OUTPATIENT
Start: 2024-12-05 | End: 2024-12-05 | Stop reason: SDUPTHER

## 2024-12-05 RX ORDER — SPIRONOLACTONE 25 MG/1
50 TABLET ORAL DAILY
Status: DISCONTINUED | OUTPATIENT
Start: 2024-12-06 | End: 2024-12-07 | Stop reason: HOSPADM

## 2024-12-05 RX ORDER — DEXAMETHASONE SODIUM PHOSPHATE 10 MG/ML
INJECTION, SOLUTION INTRAMUSCULAR; INTRAVENOUS
Status: DISCONTINUED | OUTPATIENT
Start: 2024-12-05 | End: 2024-12-05 | Stop reason: SDUPTHER

## 2024-12-05 RX ORDER — ONDANSETRON 2 MG/ML
4 INJECTION INTRAMUSCULAR; INTRAVENOUS
Status: DISCONTINUED | OUTPATIENT
Start: 2024-12-05 | End: 2024-12-05

## 2024-12-05 RX ORDER — PANTOPRAZOLE SODIUM 40 MG/1
40 TABLET, DELAYED RELEASE ORAL
Status: DISCONTINUED | OUTPATIENT
Start: 2024-12-05 | End: 2024-12-07 | Stop reason: HOSPADM

## 2024-12-05 RX ORDER — BUPROPION HYDROCHLORIDE 150 MG/1
300 TABLET ORAL DAILY
Status: DISCONTINUED | OUTPATIENT
Start: 2024-12-06 | End: 2024-12-07 | Stop reason: HOSPADM

## 2024-12-05 RX ORDER — MIDAZOLAM HYDROCHLORIDE 1 MG/ML
INJECTION, SOLUTION INTRAMUSCULAR; INTRAVENOUS
Status: DISCONTINUED | OUTPATIENT
Start: 2024-12-05 | End: 2024-12-05 | Stop reason: SDUPTHER

## 2024-12-05 RX ORDER — BUPIVACAINE HYDROCHLORIDE AND EPINEPHRINE 2.5; 5 MG/ML; UG/ML
INJECTION, SOLUTION INFILTRATION; PERINEURAL PRN
Status: DISCONTINUED | OUTPATIENT
Start: 2024-12-05 | End: 2024-12-05 | Stop reason: ALTCHOICE

## 2024-12-05 RX ORDER — BUSPIRONE HYDROCHLORIDE 15 MG/1
15 TABLET ORAL 2 TIMES DAILY
Status: DISCONTINUED | OUTPATIENT
Start: 2024-12-05 | End: 2024-12-07 | Stop reason: HOSPADM

## 2024-12-05 RX ORDER — IPRATROPIUM BROMIDE AND ALBUTEROL SULFATE 2.5; .5 MG/3ML; MG/3ML
1 SOLUTION RESPIRATORY (INHALATION)
Status: DISCONTINUED | OUTPATIENT
Start: 2024-12-05 | End: 2024-12-05

## 2024-12-05 RX ORDER — VANCOMYCIN HYDROCHLORIDE 1 G/20ML
INJECTION, POWDER, LYOPHILIZED, FOR SOLUTION INTRAVENOUS
Status: DISPENSED
Start: 2024-12-05 | End: 2024-12-05

## 2024-12-05 RX ORDER — ALBUTEROL SULFATE 90 UG/1
2 INHALANT RESPIRATORY (INHALATION)
Status: DISCONTINUED | OUTPATIENT
Start: 2024-12-05 | End: 2024-12-07 | Stop reason: HOSPADM

## 2024-12-05 RX ORDER — BUDESONIDE 0.25 MG/2ML
0.25 INHALANT ORAL 2 TIMES DAILY
Status: DISCONTINUED | OUTPATIENT
Start: 2024-12-05 | End: 2024-12-07 | Stop reason: HOSPADM

## 2024-12-05 RX ORDER — MIDAZOLAM HYDROCHLORIDE 2 MG/2ML
2 INJECTION, SOLUTION INTRAMUSCULAR; INTRAVENOUS
Status: DISCONTINUED | OUTPATIENT
Start: 2024-12-05 | End: 2024-12-05

## 2024-12-05 RX ORDER — AMLODIPINE BESYLATE 5 MG/1
5 TABLET ORAL DAILY
Status: DISCONTINUED | OUTPATIENT
Start: 2024-12-06 | End: 2024-12-07 | Stop reason: HOSPADM

## 2024-12-05 RX ORDER — PROPOFOL 10 MG/ML
INJECTION, EMULSION INTRAVENOUS
Status: DISCONTINUED | OUTPATIENT
Start: 2024-12-05 | End: 2024-12-05 | Stop reason: SDUPTHER

## 2024-12-05 RX ADMIN — SODIUM CHLORIDE, POTASSIUM CHLORIDE, SODIUM LACTATE AND CALCIUM CHLORIDE: 600; 310; 30; 20 INJECTION, SOLUTION INTRAVENOUS at 10:09

## 2024-12-05 RX ADMIN — PANTOPRAZOLE SODIUM 40 MG: 40 TABLET, DELAYED RELEASE ORAL at 16:23

## 2024-12-05 RX ADMIN — HYDROMORPHONE HYDROCHLORIDE 0.5 MG: 1 INJECTION, SOLUTION INTRAMUSCULAR; INTRAVENOUS; SUBCUTANEOUS at 14:06

## 2024-12-05 RX ADMIN — TIZANIDINE 4 MG: 4 TABLET ORAL at 16:23

## 2024-12-05 RX ADMIN — ROCURONIUM BROMIDE 50 MG: 10 INJECTION, SOLUTION INTRAVENOUS at 10:11

## 2024-12-05 RX ADMIN — Medication 2000 MG: at 10:24

## 2024-12-05 RX ADMIN — Medication 100 MCG: at 11:15

## 2024-12-05 RX ADMIN — IPRATROPIUM BROMIDE 2 SPRAY: 21 SPRAY, METERED NASAL at 21:22

## 2024-12-05 RX ADMIN — MAGNESIUM SULFATE 1000 MG: 1 INJECTION INTRAVENOUS at 10:26

## 2024-12-05 RX ADMIN — KETOROLAC TROMETHAMINE 30 MG: 30 INJECTION, SOLUTION INTRAMUSCULAR at 13:23

## 2024-12-05 RX ADMIN — BUDESONIDE 250 MCG: 0.25 SUSPENSION RESPIRATORY (INHALATION) at 20:17

## 2024-12-05 RX ADMIN — FENTANYL CITRATE 50 MCG: 50 INJECTION, SOLUTION INTRAMUSCULAR; INTRAVENOUS at 10:11

## 2024-12-05 RX ADMIN — MIDAZOLAM 2 MG: 1 INJECTION INTRAMUSCULAR; INTRAVENOUS at 10:07

## 2024-12-05 RX ADMIN — BUSPIRONE HYDROCHLORIDE 15 MG: 15 TABLET ORAL at 21:22

## 2024-12-05 RX ADMIN — DEXAMETHASONE SODIUM PHOSPHATE 10 MG: 10 INJECTION INTRAMUSCULAR; INTRAVENOUS at 10:26

## 2024-12-05 RX ADMIN — ROCURONIUM BROMIDE 20 MG: 10 INJECTION, SOLUTION INTRAVENOUS at 12:05

## 2024-12-05 RX ADMIN — ROCURONIUM BROMIDE 20 MG: 10 INJECTION, SOLUTION INTRAVENOUS at 11:03

## 2024-12-05 RX ADMIN — ROSUVASTATIN 20 MG: 20 TABLET, FILM COATED ORAL at 21:22

## 2024-12-05 RX ADMIN — SUGAMMADEX 100 MG: 100 INJECTION, SOLUTION INTRAVENOUS at 13:41

## 2024-12-05 RX ADMIN — FLUTICASONE PROPIONATE 1 SPRAY: 50 SPRAY, METERED NASAL at 16:23

## 2024-12-05 RX ADMIN — AZELASTINE 1 SPRAY: 1 SPRAY, METERED NASAL at 21:22

## 2024-12-05 RX ADMIN — LIDOCAINE HYDROCHLORIDE 50 MG: 10 INJECTION, SOLUTION EPIDURAL; INFILTRATION; INTRACAUDAL; PERINEURAL at 10:11

## 2024-12-05 RX ADMIN — ALBUTEROL SULFATE 2 PUFF: 90 AEROSOL, METERED RESPIRATORY (INHALATION) at 20:17

## 2024-12-05 RX ADMIN — ONDANSETRON 4 MG: 2 INJECTION INTRAMUSCULAR; INTRAVENOUS at 13:23

## 2024-12-05 RX ADMIN — HYDROMORPHONE HYDROCHLORIDE 0.5 MG: 1 INJECTION, SOLUTION INTRAMUSCULAR; INTRAVENOUS; SUBCUTANEOUS at 14:15

## 2024-12-05 RX ADMIN — SUGAMMADEX 100 MG: 100 INJECTION, SOLUTION INTRAVENOUS at 13:53

## 2024-12-05 RX ADMIN — BREXPIPRAZOLE 1 MG: 1 TABLET ORAL at 16:23

## 2024-12-05 RX ADMIN — FENTANYL CITRATE 50 MCG: 50 INJECTION, SOLUTION INTRAMUSCULAR; INTRAVENOUS at 13:52

## 2024-12-05 RX ADMIN — PROPOFOL 150 MG: 10 INJECTION, EMULSION INTRAVENOUS at 10:11

## 2024-12-05 ASSESSMENT — PAIN SCALES - GENERAL
PAINLEVEL_OUTOF10: 7
PAINLEVEL_OUTOF10: 8
PAINLEVEL_OUTOF10: 3
PAINLEVEL_OUTOF10: 4

## 2024-12-05 ASSESSMENT — PAIN - FUNCTIONAL ASSESSMENT
PAIN_FUNCTIONAL_ASSESSMENT: 0-10
PAIN_FUNCTIONAL_ASSESSMENT: ACTIVITIES ARE NOT PREVENTED

## 2024-12-05 ASSESSMENT — PAIN DESCRIPTION - LOCATION
LOCATION: BACK

## 2024-12-05 ASSESSMENT — PAIN DESCRIPTION - ORIENTATION
ORIENTATION: LOWER

## 2024-12-05 ASSESSMENT — PAIN DESCRIPTION - ONSET: ONSET: ON-GOING

## 2024-12-05 ASSESSMENT — PAIN DESCRIPTION - DESCRIPTORS: DESCRIPTORS: DISCOMFORT

## 2024-12-05 ASSESSMENT — PAIN DESCRIPTION - FREQUENCY: FREQUENCY: CONTINUOUS

## 2024-12-05 ASSESSMENT — PAIN DESCRIPTION - PAIN TYPE
TYPE: SURGICAL PAIN

## 2024-12-05 NOTE — PLAN OF CARE
Problem: Pain  Goal: Verbalizes/displays adequate comfort level or baseline comfort level  Outcome: Progressing  Flowsheets (Taken 12/5/2024 1537)  Verbalizes/displays adequate comfort level or baseline comfort level:   Encourage patient to monitor pain and request assistance   Assess pain using appropriate pain scale   Implement non-pharmacological measures as appropriate and evaluate response   Administer analgesics based on type and severity of pain and evaluate response   Consider cultural and social influences on pain and pain management   Notify Licensed Independent Practitioner if interventions unsuccessful or patient reports new pain     Problem: Discharge Planning  Goal: Discharge to home or other facility with appropriate resources  Outcome: Progressing  Flowsheets (Taken 12/5/2024 1500)  Discharge to home or other facility with appropriate resources:   Identify barriers to discharge with patient and caregiver   Arrange for needed discharge resources and transportation as appropriate   Identify discharge learning needs (meds, wound care, etc)     Problem: ABCDS Injury Assessment  Goal: Absence of physical injury  Outcome: Progressing

## 2024-12-05 NOTE — H&P
H&P    Patient:  Jacque Luciano  YOB: 1960     64 y.o. female    Subjective:  Jacque Luciano is a 64 year old female who presents today with complaint of low back pain with right-sided radicular symptoms in the L5 distribution.  She endorses slight weakness of the right lower extremity, as well as sharp shooting lightening-type pains, numbness, tingling.  She presents today for L4 posterior lumbar laminectomy with posterior lateral fusion of L4-5.  She previously underwent laminectomy of this same level with neurosurgery years ago at an outlying hospital to treat her left-sided radicular symptoms, which have resolved since the surgery. Today she has only right sided radicular symptoms. No new complaints or concerns per patient this morning. Denies fever/chills, HA, CP, SOB, N/V.  Past medical history is significant for hypertension, asthma, hyperlipidemia, depression.  Prior orthopedic history includes left hip replacement with Dr. Glover in 2017, bilateral carpal tunnel release, right bunionectomy.  No history of diabetes, denies nicotine use.        Objective:   Vitals:    12/05/24 0800   BP: 111/78   Pulse: 80   Resp: 21   Temp:    SpO2: 97%     Gen: NAD, cooperative   Cardiovascular: Regular rate, no dependent edema  Respiratory: No accessory muscle use, able to speak in full sentences, equal chest rise  Abdomen: Nondistended, nontender, soft    Back: Healed midline incision of the lower lumbar spine. No godfrey-incisional induration, erythema, or fluctuance.  Tenderness to palpation at the level of L4-5.     LE  L psoas 5/5, R psoas 5/5  L quads 5/5, R quads 5/5  L TA 5/5, R TA 4+/5  L EHL 5/5, R EHL 5/5  L GSC 5/5, R GSC 5/5  Sensation intact to light touch L2-S1 bilaterally  Slight dysesthesias in the right L5 distribution    2+ Patellar & Achilles reflexes     No results for input(s): \"WBC\", \"HGB\", \"HCT\", \"PLT\", \"INR\", \"NA\", \"K\", \"BUN\", \"CREATININE\", \"GLUCOSE\", \"ESR\", \"CRP\" in the last 72

## 2024-12-05 NOTE — BRIEF OP NOTE
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 Item Serial No.  Lot No. LRB No. Used Action   GRAFT BNE SUB 5CC DEMIN BNE MTRX PTTY GRFTON - [id number]  GRAFT BNE SUB 5CC DEMIN BNE MTRX PTTY GRFTON [id number] [employer]-WD  N/A 1 Implanted   ENDCAP SPNL ЕКАТЕРИНА FOR SCR REVERE - [id number]  ENDCAP SPNL ЕКАТЕРИНА FOR SCR REVERE  [employer]WD  N/A 4 Implanted   LORAINE SPNL L40MM DIA5.5MM POST THORLUM TI CRV SMOOTH NTHRD - [id number]  LORAINE SPNL L40MM DIA5.5MM POST THORLUM TI CRV SMOOTH NTHRD  [employer]-WD  N/A 2 Implanted   SCREW SPNL L45MM DIA5.5MM CANC PEDCL THORLUM TI NTHRD ЕКАТЕРИНА - [id number]  SCREW SPNL L45MM DIA5.5MM CANC PEDCL THORLUM TI NTHRD ЕКАТЕРИНА  [employer]WD  N/A 4 Implanted         Drains:   [REMOVED] Urinary Catheter 12/05/24 2 Way (Removed)       Findings:  Infection Present At Time Of Surgery (PATOS) (choose all levels that have infection present):  No infection present  Other Findings: See op note     Electronically signed by DANIELLE LINDA DO on 12/5/2024 at 2:00 PM

## 2024-12-05 NOTE — RT PROTOCOL NOTE
RT Inhaler-Nebulizer Bronchodilator Protocol Note    There is a bronchodilator order in the chart from a provider indicating to follow the RT Bronchodilator Protocol and there is an “Initiate RT Inhaler-Nebulizer Bronchodilator Protocol” order as well (see protocol at bottom of note).    CXR Findings:  No results found.    The findings from the last RT Protocol Assessment were as follows:   History Pulmonary Disease: Chronic pulmonary disease  Respiratory Pattern: Regular pattern and RR 12-20 bpm  Breath Sounds: Clear breath sounds  Cough: Weak, productive  Indication for Bronchodilator Therapy:    Bronchodilator Assessment Score: 4    Aerosolized bronchodilator medication orders have been revised according to the RT Inhaler-Nebulizer Bronchodilator Protocol below.    Respiratory Therapist to perform RT Therapy Protocol Assessment initially then follow the protocol.  Repeat RT Therapy Protocol Assessment PRN for score 0-3 or on second treatment, BID, and PRN for scores above 3.    No Indications - adjust the frequency to every 6 hours PRN wheezing or bronchospasm, if no treatments needed after 48 hours then discontinue using Per Protocol order mode.     If indication present, adjust the RT bronchodilator orders based on the Bronchodilator Assessment Score as indicated below.  Use Inhaler orders unless patient has one or more of the following: on home nebulizer, not able to hold breath for 10 seconds, is not alert and oriented, cannot activate and use MDI correctly, or respiratory rate 25 breaths per minute or more, then use the equivalent nebulizer order(s) with same Frequency and PRN reasons based on the score.  If a patient is on this medication at home then do not decrease Frequency below that used at home.    0-3 - enter or revise RT bronchodilator order(s) to equivalent RT Bronchodilator order with Frequency of every 4 hours PRN for wheezing or increased work of breathing using Per Protocol order mode.

## 2024-12-05 NOTE — ANESTHESIA POSTPROCEDURE EVALUATION
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    Luli Phase I: Luli Score: 7    Luli Phase II:      Anesthesia Post Evaluation    No notable events documented.   INTERVENTIONAL RADIOLOGY LOCAL ASSESSMENT NOTE:     Patient: Alondra Mathur Date: 2017   : 1981 Attending: Bebe Powell MD   36 year old female          PROCEDURE ASSESSMENT- LOCAL ANESTHESIA     Preop Diagnosis:    1. Multiple thyroid nodules        Indications for Procedure: Thyroid Nodule     Planned Procedure: Biopsy    Planned Anesthetic:  local     MEDICAL HISTORY / COMORBID CONDITIONS:  Reviewed. See below.     Normal mental status:   yes     EXAMINATION PERTINENT TO PROCEDURE BEING PERFORMED  Evaluation of operative site: Examination of operative site completed, WNL.      OTHER FINDINGS  Reviewed current medications and allergies yes     PERTINENT LAB / DIAGNOSTIC TESTSPERTINENT LAB / DIAGNOSTIC TESTS  Reviewed. See below.     INFORMED CONSENT  Consent obtained: yes  Risks / benefits, complications, and alternatives explained, questions answered and patient / personal representative agrees to:  procedure listed above and anesthetic listed above     Geoffrey Kc MD           INFORMATION REVIEWED  Past Medical History:   Diagnosis Date   • ADD (attention deficit disorder with hyperactivity)    • HPV (human papilloma virus) infection 12    pap negative/ hpv positive     Patient Active Problem List   Diagnosis   • General counseling for initiation of other contraceptive measures   • Papanicolaou smear of cervix with atypical squamous cells of undetermined significance (ASC-US)   • ADRYAN III (cervical intraepithelial neoplasia III)   • Supervision of normal first pregnancy   • Varicose veins of leg with pain      Pertinent Labs  No results found for: PT No results found for: INR  WBC (K/mcL)   Date Value   2017 7.9     RBC (mil/mcL)   Date Value   2017 4.65     HCT (%)   Date Value   2017 41.0     HGB (g/dL)   Date Value   2017 14.3     PLT (K/mcL)   Date Value   2017 308

## 2024-12-05 NOTE — ANESTHESIA PRE PROCEDURE
Department of Anesthesiology  Preprocedure Note       Name:  Jacque Luciano   Age:  64 y.o.  :  1960                                          MRN:  [id number]         Date:  2024      Surgeon: Surgeon(s):  Bernardo Beltran MD    Procedure: Procedure(s):  L4 POSTERIOR LUMBAR LAMINECTOMY WITH POSTEROLATERAL FUSION    Medications prior to admission:   Prior to Admission medications    Medication Sig Start Date End Date Taking? Authorizing Provider   fluticasone propionate (FLOVENT DISKUS) 250 MCG/ACT inhaler Inhale 1 puff into the lungs 2 times daily   Yes Evelia Garcia MD   ipratropium (ATROVENT) 0.03 % nasal spray 2 sprays by Each Nostril route in the morning and 2 sprays in the evening.   Yes Evelia Garcia MD   ROSUVASTATIN CALCIUM PO Take 20 mg by mouth Daily   Yes Evelia Garcia MD   spironolactone (ALDACTONE) 50 MG tablet Take 1 tablet by mouth daily   Yes Evelia Garcia MD   amLODIPine (NORVASC) 5 MG tablet Take 1 tablet by mouth daily 17  Yes Evelia Garcia MD   fluticasone (FLONASE) 50 MCG/ACT nasal spray 1 spray by Nasal route 2 times daily   Yes Evelia Garcia MD   omeprazole (PRILOSEC) 20 MG delayed release capsule Take 2 capsules by mouth Daily   Yes Evelia Garcia MD   azelastine (ASTELIN) 0.1 % nasal spray 1 spray by Nasal route 2 times daily Use in each nostril as directed    Evelia Garcia MD   Vitamin D3 125 MCG (5000 UT) TABS tablet Take 1 tablet by mouth daily    Evelia Garcia MD   zinc 50 MG TABS tablet Take 1 tablet by mouth daily    Evelia Garcia MD   Multiple Vitamins-Minerals (ONE-A-DAY 50 PLUS PO) Take by mouth Daily    vEelia Garcia MD   brexpiprazole (REXULTI) 1 MG TABS tablet Take 1 tablet by mouth daily    Evelia Garcia MD   Multiple Minerals-Vitamins (CALCIUM & VIT D3 BONE HEALTH PO) Take by mouth daily    Evelia Garcia MD   albuterol sulfate HFA (PROVENTIL;VENTOLIN;PROAIR)

## 2024-12-06 PROBLEM — G89.18 POST-OP PAIN: Status: ACTIVE | Noted: 2024-12-06

## 2024-12-06 PROBLEM — M53.2X6 LUMBAR SPINE INSTABILITY: Status: ACTIVE | Noted: 2024-12-06

## 2024-12-06 PROCEDURE — 6360000002 HC RX W HCPCS: Performed by: ORTHOPAEDIC SURGERY

## 2024-12-06 PROCEDURE — 97166 OT EVAL MOD COMPLEX 45 MIN: CPT

## 2024-12-06 PROCEDURE — 6370000000 HC RX 637 (ALT 250 FOR IP)

## 2024-12-06 PROCEDURE — 94761 N-INVAS EAR/PLS OXIMETRY MLT: CPT

## 2024-12-06 PROCEDURE — 94640 AIRWAY INHALATION TREATMENT: CPT

## 2024-12-06 PROCEDURE — 97116 GAIT TRAINING THERAPY: CPT

## 2024-12-06 PROCEDURE — 6370000000 HC RX 637 (ALT 250 FOR IP): Performed by: ORTHOPAEDIC SURGERY

## 2024-12-06 PROCEDURE — 97535 SELF CARE MNGMENT TRAINING: CPT

## 2024-12-06 PROCEDURE — 2580000003 HC RX 258

## 2024-12-06 PROCEDURE — 97162 PT EVAL MOD COMPLEX 30 MIN: CPT

## 2024-12-06 RX ORDER — HYDROCODONE BITARTRATE AND ACETAMINOPHEN 5; 325 MG/1; MG/1
1 TABLET ORAL EVERY 4 HOURS PRN
Status: DISCONTINUED | OUTPATIENT
Start: 2024-12-06 | End: 2024-12-07 | Stop reason: HOSPADM

## 2024-12-06 RX ORDER — FAMOTIDINE 20 MG/1
20 TABLET, FILM COATED ORAL 2 TIMES DAILY
Status: DISCONTINUED | OUTPATIENT
Start: 2024-12-06 | End: 2024-12-06

## 2024-12-06 RX ORDER — SODIUM CHLORIDE 9 MG/ML
INJECTION, SOLUTION INTRAVENOUS CONTINUOUS
Status: DISCONTINUED | OUTPATIENT
Start: 2024-12-06 | End: 2024-12-07 | Stop reason: HOSPADM

## 2024-12-06 RX ORDER — ONDANSETRON 2 MG/ML
4 INJECTION INTRAMUSCULAR; INTRAVENOUS EVERY 6 HOURS PRN
Status: DISCONTINUED | OUTPATIENT
Start: 2024-12-06 | End: 2024-12-07 | Stop reason: HOSPADM

## 2024-12-06 RX ORDER — SODIUM PHOSPHATE, DIBASIC AND SODIUM PHOSPHATE, MONOBASIC 7; 19 G/230ML; G/230ML
1 ENEMA RECTAL DAILY PRN
Status: DISCONTINUED | OUTPATIENT
Start: 2024-12-06 | End: 2024-12-07 | Stop reason: HOSPADM

## 2024-12-06 RX ORDER — OXYCODONE AND ACETAMINOPHEN 5; 325 MG/1; MG/1
1 TABLET ORAL EVERY 4 HOURS PRN
Status: DISCONTINUED | OUTPATIENT
Start: 2024-12-06 | End: 2024-12-06 | Stop reason: SDUPTHER

## 2024-12-06 RX ORDER — SODIUM CHLORIDE 0.9 % (FLUSH) 0.9 %
5-40 SYRINGE (ML) INJECTION PRN
Status: DISCONTINUED | OUTPATIENT
Start: 2024-12-06 | End: 2024-12-07 | Stop reason: HOSPADM

## 2024-12-06 RX ORDER — ONDANSETRON 4 MG/1
4 TABLET, ORALLY DISINTEGRATING ORAL EVERY 8 HOURS PRN
Status: DISCONTINUED | OUTPATIENT
Start: 2024-12-06 | End: 2024-12-07 | Stop reason: HOSPADM

## 2024-12-06 RX ORDER — ACETAMINOPHEN 325 MG/1
650 TABLET ORAL EVERY 6 HOURS
Status: DISCONTINUED | OUTPATIENT
Start: 2024-12-06 | End: 2024-12-07 | Stop reason: HOSPADM

## 2024-12-06 RX ORDER — FAMOTIDINE 20 MG/1
20 TABLET, FILM COATED ORAL DAILY
Status: DISCONTINUED | OUTPATIENT
Start: 2024-12-06 | End: 2024-12-07 | Stop reason: HOSPADM

## 2024-12-06 RX ORDER — BISACODYL 5 MG/1
5 TABLET, DELAYED RELEASE ORAL DAILY
Status: DISCONTINUED | OUTPATIENT
Start: 2024-12-06 | End: 2024-12-07 | Stop reason: HOSPADM

## 2024-12-06 RX ORDER — TRAMADOL HYDROCHLORIDE 50 MG/1
50 TABLET ORAL EVERY 4 HOURS PRN
Qty: 42 TABLET | Refills: 0 | Status: SHIPPED | OUTPATIENT
Start: 2024-12-06 | End: 2024-12-13

## 2024-12-06 RX ORDER — SODIUM CHLORIDE 9 MG/ML
INJECTION, SOLUTION INTRAVENOUS PRN
Status: DISCONTINUED | OUTPATIENT
Start: 2024-12-06 | End: 2024-12-07 | Stop reason: HOSPADM

## 2024-12-06 RX ORDER — SODIUM CHLORIDE 0.9 % (FLUSH) 0.9 %
5-40 SYRINGE (ML) INJECTION EVERY 12 HOURS SCHEDULED
Status: DISCONTINUED | OUTPATIENT
Start: 2024-12-06 | End: 2024-12-07 | Stop reason: HOSPADM

## 2024-12-06 RX ADMIN — ROSUVASTATIN 20 MG: 20 TABLET, FILM COATED ORAL at 20:13

## 2024-12-06 RX ADMIN — PANTOPRAZOLE SODIUM 40 MG: 40 TABLET, DELAYED RELEASE ORAL at 05:56

## 2024-12-06 RX ADMIN — BREXPIPRAZOLE 1 MG: 1 TABLET ORAL at 09:11

## 2024-12-06 RX ADMIN — BISACODYL 5 MG: 5 TABLET, COATED ORAL at 09:11

## 2024-12-06 RX ADMIN — FAMOTIDINE 20 MG: 20 TABLET ORAL at 09:12

## 2024-12-06 RX ADMIN — BUDESONIDE 250 MCG: 0.25 SUSPENSION RESPIRATORY (INHALATION) at 08:17

## 2024-12-06 RX ADMIN — ACETAMINOPHEN 650 MG: 325 TABLET ORAL at 09:11

## 2024-12-06 RX ADMIN — SODIUM CHLORIDE: 9 INJECTION, SOLUTION INTRAVENOUS at 17:26

## 2024-12-06 RX ADMIN — BUSPIRONE HYDROCHLORIDE 15 MG: 15 TABLET ORAL at 20:14

## 2024-12-06 RX ADMIN — HYDROCODONE BITARTRATE AND ACETAMINOPHEN 1 TABLET: 5; 325 TABLET ORAL at 09:11

## 2024-12-06 RX ADMIN — ALBUTEROL SULFATE 2 PUFF: 90 AEROSOL, METERED RESPIRATORY (INHALATION) at 08:17

## 2024-12-06 RX ADMIN — IPRATROPIUM BROMIDE 2 SPRAY: 21 SPRAY, METERED NASAL at 20:05

## 2024-12-06 RX ADMIN — HYDROCODONE BITARTRATE AND ACETAMINOPHEN 1 TABLET: 5; 325 TABLET ORAL at 14:14

## 2024-12-06 RX ADMIN — AZELASTINE 1 SPRAY: 1 SPRAY, METERED NASAL at 20:04

## 2024-12-06 RX ADMIN — SODIUM CHLORIDE, PRESERVATIVE FREE 10 ML: 5 INJECTION INTRAVENOUS at 09:12

## 2024-12-06 RX ADMIN — ACETAMINOPHEN 650 MG: 325 TABLET ORAL at 17:22

## 2024-12-06 RX ADMIN — ALBUTEROL SULFATE 2 PUFF: 90 AEROSOL, METERED RESPIRATORY (INHALATION) at 21:24

## 2024-12-06 RX ADMIN — TIZANIDINE 4 MG: 4 TABLET ORAL at 06:02

## 2024-12-06 RX ADMIN — BUDESONIDE 250 MCG: 0.25 SUSPENSION RESPIRATORY (INHALATION) at 21:27

## 2024-12-06 RX ADMIN — BUSPIRONE HYDROCHLORIDE 15 MG: 15 TABLET ORAL at 09:11

## 2024-12-06 RX ADMIN — AMLODIPINE BESYLATE 5 MG: 5 TABLET ORAL at 09:11

## 2024-12-06 RX ADMIN — PANTOPRAZOLE SODIUM 40 MG: 40 TABLET, DELAYED RELEASE ORAL at 17:22

## 2024-12-06 RX ADMIN — SPIRONOLACTONE 50 MG: 25 TABLET, FILM COATED ORAL at 09:11

## 2024-12-06 RX ADMIN — SODIUM CHLORIDE: 9 INJECTION, SOLUTION INTRAVENOUS at 09:18

## 2024-12-06 RX ADMIN — TIZANIDINE 4 MG: 4 TABLET ORAL at 18:46

## 2024-12-06 RX ADMIN — BUPROPION HYDROCHLORIDE 300 MG: 150 TABLET, EXTENDED RELEASE ORAL at 09:11

## 2024-12-06 RX ADMIN — ACETAMINOPHEN 650 MG: 325 TABLET ORAL at 20:13

## 2024-12-06 ASSESSMENT — PAIN SCALES - GENERAL
PAINLEVEL_OUTOF10: 7
PAINLEVEL_OUTOF10: 4
PAINLEVEL_OUTOF10: 8
PAINLEVEL_OUTOF10: 2
PAINLEVEL_OUTOF10: 4
PAINLEVEL_OUTOF10: 8
PAINLEVEL_OUTOF10: 6

## 2024-12-06 ASSESSMENT — PAIN DESCRIPTION - LOCATION
LOCATION: BACK

## 2024-12-06 ASSESSMENT — PAIN DESCRIPTION - DESCRIPTORS: DESCRIPTORS: THROBBING;ACHING

## 2024-12-06 ASSESSMENT — PAIN DESCRIPTION - ORIENTATION
ORIENTATION: MID
ORIENTATION: MID;LOWER

## 2024-12-06 NOTE — PROGRESS NOTES
Pharmacy Note - Renal dose adjustment made per P/T protocol    Original order:  Famotidine 20mg BID    Estimated Creatinine Clearance: 44 mL/min (A) (based on SCr of 1.3 mg/dL (H)).    Renally adjusted order:  Famotidine 20mg Daily    Please call pharmacy with any questions.    Thank you,  Dexter Pretty [facility]  12/6/2024 8:39 AM

## 2024-12-06 NOTE — PROGRESS NOTES
Counseling and Referral of Other Preventative  (Italic type indicates deductible and co-insurance are waived)    Patient Name: Manuelito Loomis  Today's Date: 2/13/2023    Health Maintenance       Date Due Completion Date    TETANUS VACCINE 07/16/2020 7/16/2010    COVID-19 Vaccine (4 - Booster for Pfizer series) 12/24/2021 10/29/2021    PROSTATE-SPECIFIC ANTIGEN 09/29/2023 9/29/2022    Hemoglobin A1c (Prediabetes) 09/29/2023 9/29/2022    Lipid Panel 09/29/2023 9/29/2022    High Dose Statin 02/13/2024 2/13/2023    Colorectal Cancer Screening 05/23/2024 5/23/2014        No orders of the defined types were placed in this encounter.      The following information is provided to all patients.  This information is to help you find resources for any of the problems found today that may be affecting your health:                Living healthy guide: www.[facility].[place].[facility]      Understanding Diabetes: www.diabetes.org      Eating healthy: www.cdc.gov/healthyweight      CDC home safety checklist: www.cdc.gov/steadi/patient.html      Agency on Aging: www.goea.[place].[facility]      Alcoholics anonymous (AA): www.aa.org      Physical Activity: www.ward.nih.gov/bo6tokc      Tobacco use: www.quitwithusla.org      Physical Therapy  Facility/Department: [address]  Physical Therapy Initial Assessment    Name: Jacque Luciano  : 1960  MRN: [id number]  Date of Service: 2024    Discharge Recommendations:  Continue to assess pending progress   PT Equipment Recommendations  Equipment Needed: No         Patient Diagnosis(es): The encounter diagnosis was Post-operative pain.  Past Medical History:  has a past medical history of Accelerated essential hypertension, Arthritis, Asthma, Cancer (HCC), Depression, and Hyperlipidemia.  Past Surgical History:  has a past surgical history that includes Mandible fracture surgery (Bilateral); Ectopic pregnancy surgery; Carpal tunnel release (Bilateral); Dilation and curettage of uterus;  section; Hysterectomy (); joint replacement (Left, 2017); Colonoscopy; skin biopsy (Left, ); other surgical history (09/10/2019); Nephrostomy (Left, 09/10/2019); Cystoscopy (Left, 09/10/2019); Cystoscopy (Left, 09/10/2019); NEPHROLITHOTOMY (Left, 09/10/2019); Bunionectomy (Right); back surgery; and shoulder surgery (Left).    Assessment  Body Structures, Functions, Activity Limitations Requiring Skilled Therapeutic Intervention: Increased pain    Assessment: Pt s/p L4 POSTERIOR LUMBAR LAMINECTOMY WITH POSTEROLATERAL FUSION on 24. At baseline, pt is independent with all ADLs and does not utilize an AD. During evaluation, pt required Isa-CGA for bed mobility/log rolling, supervision of transfers, SBA for 150ft+150ft of ambulation with RW, and CGA for four 6\" steps with BUE support. Pt reports 7/10 pain throughout treatment. Pt would benefit from continued skilled physical therapy in order to improve functional mobility without AD. With progression in therapy, pt should be able to return to prior living situation with progression towards established PT goals.     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      Vision/Hearing  Vision  Vision: Impaired  Vision Exceptions: Wears glasses at all times  Hearing  Hearing: Exceptions to WFL  Hearing Exceptions: Bilateral hearing aid (B hearing aids to help with ringing in ears.)        Cognition   Orientation  Overall Orientation Status: Within Normal Limits  Cognition  Overall Cognitive Status: WNL      Objective  Bed mobility  Rolling to Left: Contact guard assistance  Supine to Sit: Contact guard assistance  Bed Mobility Comments: Pt required cues for log rolling technique. Pt required Isa for initiation of L sidelying to sit but required CGA for every other part of the transfer.      Transfers  Sit to Stand: Supervision  Stand to Sit: Supervision      Ambulation  Surface: Level tile  Device: Rolling Walker  Assistance: Stand by assistance  Quality of Gait: Pt shows no major gait deviations. Pt shows slight decrease in pelvic rotation.  Distance: 150ft+150ft  Comments: Pt reported slight fatigue and was uncomfortable trying to ambulate without AD at end of ambulation. Attempt amb without AD next treatment.    Stairs/Curb  Stairs?: Yes  Stairs  # Steps : 4  Stairs Height: 6\"  Rails: Bilateral  Device: No Device  Assistance: Contact guard assistance  Comment: Pt utilized step through pattern for ascend and step to pattern for descend. Pt reported minor lightheadedness after ascending 4 steps. Lightheadedness was resolved with ~1 minute rest. SpO2 was recorded at 94%       Balance  Posture: Fair  Sitting - Static: Good  Sitting - Dynamic: Good;-  Standing - Static: Good  Standing - Dynamic: Fair;+  Comments: Pt was able to stand without AD with

## 2024-12-06 NOTE — DISCHARGE SUMMARY
Discharge Summary    Date: 12/6/2024  Patient Name: Jacque Luciano    YOB: 1960     Age: 64 y.o.    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 *      Hospital Stay  Narrative of Hospital Course:  Pain medication and PT/OT eval    Consultants:  None    Surgeries/procedures Performed:      Treatments:    Analgesia, Antibiotics and Surgery    Acetaminophen and Other, Ancef    Discharge Plan/Disposition:  Home    Hospital/Incidental Findings Requiring Follow Up:    Patient Instructions:    Diet: Regular Diet    Activity:Activity as Tolerated, Activiity as Tolerated, No Driving for Today, No Lifting, Driving or Strenuous Excercise, No Driving for 2 Weeks, No Driving While on Analgesics and No Heavy Lifting  For number of days (if applicable):      Other Instructions: Dc in improved neurologic condition    DC home with family    Provider Follow-Up:   No follow-ups on file.     Significant Diagnostic Studies:    Recent Labs:  No visits with results within 1 Day(s) from this visit.  Latest known visit with results is:  Hospital Outpatient Visit on 10/03/2024  Sodium                                        Date: 10/03/2024  Value: 140         Ref range: 136 - 145 mmol/L   Status: Final  Potassium                                     Date: 10/03/2024  Value: 4.4         Ref range: 3.7 - 5.3 mmol/L   Status: Final  Chloride                                      Date: 10/03/2024  Value: 103         Ref range: 98 - 107 mmol/L    Status: Final  CO2                                           Date: 10/03/2024  Value: 27          Ref range: 20 - 31 mmol/L     Status: Final  Anion Gap

## 2024-12-06 NOTE — PROGRESS NOTES
Progress Note  Date:2024       Room:[facility]312-01  Patient Name:Jacque Luciano     YOB: 1960     Age:64 y.o.        Subjective    Subjective  Pain to leg is gone.  BAck pain is surgical and relieved with meds    Review of Systems   No cough or chest pains.  No nausea  No headache or photophobia    Objective         Vitals Last 24 Hours:  TEMPERATURE:  Temp  Av.3 °F (36.3 °C)  Min: 97.2 °F (36.2 °C)  Max: 97.5 °F (36.4 °C)  RESPIRATIONS RANGE: Resp  Av.6  Min: 14  Max: 21  PULSE OXIMETRY RANGE: SpO2  Av.3 %  Min: 92 %  Max: 100 %  PULSE RANGE: Pulse  Av  Min: 76  Max: 84  BLOOD PRESSURE RANGE: Systolic (24hrs), Av , Min:100 , Max:111   ; Diastolic (24hrs), Av, Min:56, Max:78    I/O (24Hr):    Intake/Output Summary (Last 24 hours) at 2024 0650  Last data filed at 2024 1800  Gross per 24 hour   Intake 240 ml   Output 450 ml   Net -210 ml     Objective  Up walking and comfortable.  No edema legs  Neg homans  Soft abdomen  Dressing clean and dry     Labs/Imaging/Diagnostics    Labs:  CBC:No results for input(s): \"WBC\", \"RBC\", \"HGB\", \"HCT\", \"MCV\", \"RDW\", \"PLT\" in the last 72 hours.  CHEMISTRIES:No results for input(s): \"NA\", \"K\", \"CL\", \"CO2\", \"BUN\", \"CREATININE\", \"GLUCOSE\", \"PHOS\", \"MG\" in the last 72 hours.    Invalid input(s): \"CA\"  PT/INR:No results for input(s): \"PROTIME\", \"INR\" in the last 72 hours.  APTT:No results for input(s): \"APTT\" in the last 72 hours.  LIVER PROFILE:No results for input(s): \"AST\", \"ALT\", \"BILIDIR\", \"BILITOT\", \"ALKPHOS\" in the last 72 hours.    Imaging Last 24 Hours:  XR LUMBAR SPINE (2-3 VIEWS)    Result Date: 2024  Radiology exam is complete. No Radiologist dictation. Please follow up with ordering provider.     XR LUMBAR SPINE 1 VW    Result Date: 2024  EXAMINATION: ONE XRAY VIEW OF THE LUMBAR SPINE 2024 10:45 am COMPARISON: None. HISTORY: ORDERING SYSTEM PROVIDED HISTORY: intra-op flat plate TECHNOLOGIST PROVIDED HISTORY:

## 2024-12-06 NOTE — CARE COORDINATION
Post Acute Facility/Agency List     Provided patient with the following list, the list includes the overall star ratings obtained from CMS per the Medicare Web site (www.Medicare.gov):     [] Long Term Acute Care Facilities  [] Acute Inpatient Rehabilitation Facilities  [] Skilled Nursing Facilities  [] Hospice Facilities  [x] Home Care    Provided verbal instructions on how to utilize the QR Code to obtain additional detailed star ratings from www.Medicare.gov     offered to print and provide the detailed list:    []Accepted   [x]Declined    Patient given list to review, plan to contact Dr. Beltran if he want patient to begin PT/OT before seeing patient in the office.             
Called Dr. Beltran's phone and spoke with his office nurse Vickie and informed her of recommendation from PT to do home PT.  She said that the doctor likes to see them in the office first and them will prescribe the PT if needed.     1510-Informed patient and her nurse that doctor will evaluate patient during her post op visit to see if she should do PT/OT.  
Yes  Other Identified Issues/Barriers to RETURNING to current housing: none  Potential Assistance needed at discharge:  (TBD)            Potential DME:    Patient expects to discharge to: House  Plan for transportation at discharge: Family (TBD)    Financial    Payor: [employer] / Plan: [employer] [address] / Product Type: *No Product type* /     Does insurance require precert for SNF: Yes    Potential assistance Purchasing Medications: No  Meds-to-Beds request: No      [id number] [address] [facility] [phone] - F [phone]  [facility] [address]  Phone: [phone] Fax: [phone]    [facility] [address] [place] [address] [phone] - F [phone]  [address] [place] [id number]  Phone: [phone] Fax: [phone]      Notes:    Factors facilitating achievement of predicted outcomes: Family support, Motivated, Cooperative, Pleasant, and Sense of humor    Barriers to discharge: Pain, Limited family support, Decreased endurance, Long standing deficits, Medical complications, Wound Care, and Medication managment    Additional Case Management Notes: Patient plans to return home with family assistance and transport.  No discharge needs identified by the patient at this time.    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 Freedom of choice list with basic dialogue that supports the patient's individualized plan of care/goals and shares the quality data associated with the providers was provided to:     Patient Representative Name:       The

## 2024-12-06 NOTE — PLAN OF CARE
Problem: Respiratory - Adult  Goal: Achieves optimal ventilation and oxygenation  Outcome: Progressing  Flowsheets (Taken 12/5/2024 2023)  Achieves optimal ventilation and oxygenation:   Assess for changes in respiratory status   Respiratory therapy support as indicated   Encourage broncho-pulmonary hygiene including cough, deep breathe, incentive spirometry   Assess and instruct to report shortness of breath or any respiratory difficulty

## 2024-12-06 NOTE — PLAN OF CARE
Problem: Pain  Goal: Verbalizes/displays adequate comfort level or baseline comfort level  12/6/2024 0229 by Gonzales Sharma LPN  Outcome: Progressing     Problem: Discharge Planning  Goal: Discharge to home or other facility with appropriate resources  12/6/2024 0229 by Gonzales Sharma LPN  Outcome: Progressing  Flowsheets (Taken 12/5/2024 2000)  Discharge to home or other facility with appropriate resources: Identify barriers to discharge with patient and caregiver     Problem: ABCDS Injury Assessment  Goal: Absence of physical injury  12/6/2024 0229 by Gonzales Sharma LPN  Outcome: Progressing     Problem: Respiratory - Adult  Goal: Achieves optimal ventilation and oxygenation  12/6/2024 0229 by Gonzales Sharma LPN  Outcome: Progressing

## 2024-12-06 NOTE — PLAN OF CARE
Problem: Pain  Goal: Verbalizes/displays adequate comfort level or baseline comfort level  Outcome: Progressing     Problem: Discharge Planning  Goal: Discharge to home or other facility with appropriate resources  Outcome: Progressing  Flowsheets (Taken 12/6/2024 0900)  Discharge to home or other facility with appropriate resources:   Identify barriers to discharge with patient and caregiver   Arrange for needed discharge resources and transportation as appropriate   Identify discharge learning needs (meds, wound care, etc)   Refer to discharge planning if patient needs post-hospital services based on physician order or complex needs related to functional status, cognitive ability or social support system     Problem: ABCDS Injury Assessment  Goal: Absence of physical injury  Outcome: Progressing     Problem: Respiratory - Adult  Goal: Achieves optimal ventilation and oxygenation  Outcome: Progressing

## 2024-12-06 NOTE — PROGRESS NOTES
Occupational Therapy  Occupational Therapy Initial Evaluation  Facility/Department: [address]   Patient Name: Jacque Luciano        MRN: [id number]    : 1960    Date of Service: 2024    Discharge Recommendations  Discharge Recommendations:  (no OT needs at discharge)  OT Equipment Recommendations  Equipment Needed: Yes  Mobility Devices: ADL Assistive Devices  ADL Assistive Devices: Sock-Aid Hard, Long-handled Shoe Horn, Long-handled Sponge, Toileting - 3-in-1 Commode    No chief complaint on file.    Past Medical History:  has a past medical history of Accelerated essential hypertension, Arthritis, Asthma, Cancer (HCC), Depression, and Hyperlipidemia.  Past Surgical History:  has a past surgical history that includes Mandible fracture surgery (Bilateral); Ectopic pregnancy surgery; Carpal tunnel release (Bilateral); Dilation and curettage of uterus;  section; Hysterectomy (); joint replacement (Left, 2017); Colonoscopy; skin biopsy (Left, ); other surgical history (09/10/2019); Nephrostomy (Left, 09/10/2019); Cystoscopy (Left, 09/10/2019); Cystoscopy (Left, 09/10/2019); NEPHROLITHOTOMY (Left, 09/10/2019); Bunionectomy (Right); back surgery; shoulder surgery (Left); and lumbar fusion (N/A, 2024).    Assessment  Performance deficits / Impairments: Decreased functional mobility ;Decreased ADL status;Decreased high-level IADLs;Decreased balance  Assessment: Pt seen at this date for OT evaluation s/p L4 POSTERIOR LUMBAR LAMINECTOMY WITH POSTEROLATERAL FUSION with Dr. Beltran on 2024; WBAT. Pt previously IND with all ADLs/IADLs living with son. Pt at this date required SBA bed mobility with log roll technique, SUP for functional mobility/transfers with RW, MOD IND with LB dressing using reacher after education, MIN A for donning B slipper socks EOB, IND after set up for UB dressing, SUP sink side for grooming tasks. 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    Vision/Hearing  Vision  Vision: Within Functional Limits  Hearing  Hearing: Within functional limits    BUE Assessment  Gross Assessment  AROM: Within functional limits (Pt demo WFL AROM for BUE)  Strength: Within functional limits (Pt demo WFL strength for BUE grossly 4+/5 throughout)  Coordination: Within functional limits (Pt demo WFL coordination with engagement in functional tasks at sink side)  Sensation: Intact  Hand Dominance: Right     Objective  Orientation  Overall Orientation Status: Within Functional Limits  Orientation Level: Oriented to person;Oriented to place;Oriented to time;Oriented to situation  Cognition  Overall Cognitive Status: WFL    Activities of Daily Living  Feeding: Independent;Based on clinical judgement  Grooming: Supervision  Grooming Skilled Clinical Factors: SUP for B hand hygiene sink side with RW ~45 seconds with no LOB/unsteadiness or UE support on sink side.  UE Bathing: Modified independent   UE Bathing Skilled Clinical Factors: seated  LE Bathing: Supervision  LE Bathing Skilled Clinical Factors: seated  UE Dressing: Independent  UE Dressing Skilled Clinical Factors: IND after set up for [facility] gown while in standing position with RW.  LE Dressing: Modified independent   LE Dressing Skilled Clinical Factors: MOD IND with use of reacher to tiffanie pants seated at EOB. Pt demo good carryover with AE usage for LB dressing techniques.  Putting On/Taking Off Footwear: Minimal assistance  Putting On/Taking Off Footwear Skilled Clinical Factors: Pt

## 2024-12-06 NOTE — PROGRESS NOTES
12/06/24 1305   Encounter Summary   Service Provided For Patient   Referral/Consult From Rounding   Last Encounter  12/06/24   Complexity of Encounter Low   Spiritual/Emotional needs   Type Spiritual Support  (Volunteer)

## 2024-12-07 VITALS
RESPIRATION RATE: 18 BRPM | OXYGEN SATURATION: 97 % | HEART RATE: 90 BPM | TEMPERATURE: 97.7 F | SYSTOLIC BLOOD PRESSURE: 115 MMHG | BODY MASS INDEX: 36.25 KG/M2 | HEIGHT: 61 IN | DIASTOLIC BLOOD PRESSURE: 72 MMHG | WEIGHT: 192 LBS

## 2024-12-07 LAB
ANION GAP SERPL CALCULATED.3IONS-SCNC: 10 MMOL/L (ref 9–17)
BASOPHILS # BLD: 0 K/UL (ref 0–0.2)
BASOPHILS NFR BLD: 0 % (ref 0–2)
BUN SERPL-MCNC: 27 MG/DL (ref 8–23)
CALCIUM SERPL-MCNC: 8.4 MG/DL (ref 8.6–10.4)
CHLORIDE SERPL-SCNC: 109 MMOL/L (ref 98–107)
CO2 SERPL-SCNC: 21 MMOL/L (ref 20–31)
CREAT SERPL-MCNC: 1.2 MG/DL (ref 0.5–0.9)
EOSINOPHIL # BLD: 0.3 K/UL (ref 0–0.4)
EOSINOPHILS RELATIVE PERCENT: 2 % (ref 1–4)
ERYTHROCYTE [DISTWIDTH] IN BLOOD BY AUTOMATED COUNT: 17.1 % (ref 12.5–15.4)
GFR, ESTIMATED: 51 ML/MIN/1.73M2
GLUCOSE SERPL-MCNC: 91 MG/DL (ref 70–99)
HCT VFR BLD AUTO: 32.1 % (ref 36–46)
HGB BLD-MCNC: 10.5 G/DL (ref 12–16)
LYMPHOCYTES NFR BLD: 2 K/UL (ref 1–4.8)
LYMPHOCYTES RELATIVE PERCENT: 15 % (ref 24–44)
MCH RBC QN AUTO: 26.5 PG (ref 26–34)
MCHC RBC AUTO-ENTMCNC: 32.7 G/DL (ref 31–37)
MCV RBC AUTO: 81.2 FL (ref 80–100)
MONOCYTES NFR BLD: 0.9 K/UL (ref 0.1–1.2)
MONOCYTES NFR BLD: 7 % (ref 2–11)
NEUTROPHILS NFR BLD: 76 % (ref 36–66)
NEUTS SEG NFR BLD: 10.6 K/UL (ref 1.8–7.7)
PLATELET # BLD AUTO: 227 K/UL (ref 140–450)
PMV BLD AUTO: 8.5 FL (ref 6–12)
POTASSIUM SERPL-SCNC: 4.6 MMOL/L (ref 3.7–5.3)
RBC # BLD AUTO: 3.96 M/UL (ref 4–5.2)
SODIUM SERPL-SCNC: 140 MMOL/L (ref 135–144)
WBC OTHER # BLD: 13.8 K/UL (ref 3.5–11)

## 2024-12-07 PROCEDURE — 6370000000 HC RX 637 (ALT 250 FOR IP): Performed by: ORTHOPAEDIC SURGERY

## 2024-12-07 PROCEDURE — 94640 AIRWAY INHALATION TREATMENT: CPT

## 2024-12-07 PROCEDURE — 85025 COMPLETE CBC W/AUTO DIFF WBC: CPT

## 2024-12-07 PROCEDURE — 80048 BASIC METABOLIC PNL TOTAL CA: CPT

## 2024-12-07 PROCEDURE — 36415 COLL VENOUS BLD VENIPUNCTURE: CPT

## 2024-12-07 PROCEDURE — 6360000002 HC RX W HCPCS: Performed by: ORTHOPAEDIC SURGERY

## 2024-12-07 PROCEDURE — 2580000003 HC RX 258

## 2024-12-07 PROCEDURE — 94760 N-INVAS EAR/PLS OXIMETRY 1: CPT

## 2024-12-07 PROCEDURE — 6370000000 HC RX 637 (ALT 250 FOR IP)

## 2024-12-07 RX ADMIN — SODIUM CHLORIDE: 9 INJECTION, SOLUTION INTRAVENOUS at 01:04

## 2024-12-07 RX ADMIN — BUDESONIDE 250 MCG: 0.25 SUSPENSION RESPIRATORY (INHALATION) at 10:51

## 2024-12-07 RX ADMIN — BUPROPION HYDROCHLORIDE 300 MG: 150 TABLET, EXTENDED RELEASE ORAL at 09:42

## 2024-12-07 RX ADMIN — ACETAMINOPHEN 650 MG: 325 TABLET ORAL at 09:42

## 2024-12-07 RX ADMIN — ALBUTEROL SULFATE 2 PUFF: 90 AEROSOL, METERED RESPIRATORY (INHALATION) at 10:45

## 2024-12-07 RX ADMIN — AMLODIPINE BESYLATE 5 MG: 5 TABLET ORAL at 09:42

## 2024-12-07 RX ADMIN — SPIRONOLACTONE 50 MG: 25 TABLET, FILM COATED ORAL at 09:42

## 2024-12-07 RX ADMIN — BREXPIPRAZOLE 1 MG: 1 TABLET ORAL at 09:42

## 2024-12-07 RX ADMIN — FAMOTIDINE 20 MG: 20 TABLET ORAL at 09:42

## 2024-12-07 RX ADMIN — ACETAMINOPHEN 650 MG: 325 TABLET ORAL at 03:28

## 2024-12-07 RX ADMIN — PANTOPRAZOLE SODIUM 40 MG: 40 TABLET, DELAYED RELEASE ORAL at 06:12

## 2024-12-07 RX ADMIN — BISACODYL 5 MG: 5 TABLET, COATED ORAL at 09:42

## 2024-12-07 ASSESSMENT — PAIN SCALES - GENERAL: PAINLEVEL_OUTOF10: 5

## 2024-12-07 ASSESSMENT — PAIN DESCRIPTION - LOCATION: LOCATION: BACK

## 2024-12-07 NOTE — PLAN OF CARE
Problem: Pain  Goal: Verbalizes/displays adequate comfort level or baseline comfort level  12/7/2024 0128 by Gonzales Sharma LPN  Outcome: Progressing     Problem: Discharge Planning  Goal: Discharge to home or other facility with appropriate resources  12/7/2024 0128 by Gonzales Sharma LPN  Outcome: Progressing  Flowsheets (Taken 12/6/2024 2000)  Discharge to home or other facility with appropriate resources: Identify barriers to discharge with patient and caregiver     Problem: ABCDS Injury Assessment  Goal: Absence of physical injury  12/7/2024 0128 by Gonzales Sharma LPN  Outcome: Progressing     Problem: Respiratory - Adult  Goal: Achieves optimal ventilation and oxygenation  12/7/2024 0128 by Gonzales Sharma LPN  Outcome: Progressing  Flowsheets (Taken 12/6/2024 2000)  Achieves optimal ventilation and oxygenation: Assess for changes in respiratory status

## 2024-12-07 NOTE — OP NOTE
[address] [address]                            OPERATIVE REPORT      PATIENT NAME: NIGHAT DORANTES               : 1960  MED REC NO: [id number]                         ROOM: 312  ACCOUNT NO: [id number]                       ADMIT DATE: 2024  PROVIDER: Bernardo Beltran MD      DATE OF PROCEDURE:  2024    SURGEON:  Bernardo Beltran MD    ANESTHESIA:  General inhalation.    PREOPERATIVE DIAGNOSES:    1. Spondylolisthesis, L4-5.  2. Spinal stenosis, L4-5.  3. Instability, L4-5.    POSTOPERATIVE DIAGNOSES:    1. Spondylolisthesis, L4-5.  2. Spinal stenosis, L4-5.  3. Instability, L4-5.    PROCEDURES:    1. Decompressive lumbar laminectomy, L4, with bilateral partial medial facetectomies, L4-5.  2. Posterior lateral fusion, L4-5.  3. Holt instrumentation, L4 and L5.  4. Autogenous in situ bone grafting using laminectomy bone.  5. Allograft, L4-5.    INDICATIONS:  The patient is a 64-year-old lady with significant pain and tenderness secondary to her back, with stenosis, and spondylolisthesis and instability.  She had failed a nonoperative treatment program and presented for surgical treatment.  The surgical procedure, risks, benefits, and complications were discussed with good comprehension and informed consent obtained.    NARRATIVE OF PROCEDURE:  The patient brought into the operating room, placed under appropriate general anesthesia, and transferred to the operating table in a prone position on the Placido frame.  Bony prominences, axillae, and eyes all protected.  Perioperative antibiotics given prior to incision time, and VTE prophylaxis done intraoperatively through SCD cuffs.  The back was prepped and draped in usual fashion.  Time-out performed.    We then made incision centered across the midportion of the back, dissected down through the skin and subcutaneous tissues, we got to the spinous process

## 2024-12-07 NOTE — PLAN OF CARE
Problem: Pain  Goal: Verbalizes/displays adequate comfort level or baseline comfort level  12/7/2024 1057 by Joselito Newell RN  Outcome: Completed  Flowsheets (Taken 12/7/2024 0815)  Verbalizes/displays adequate comfort level or baseline comfort level:   Encourage patient to monitor pain and request assistance   Assess pain using appropriate pain scale   Administer analgesics based on type and severity of pain and evaluate response   Implement non-pharmacological measures as appropriate and evaluate response   Consider cultural and social influences on pain and pain management   Notify Licensed Independent Practitioner if interventions unsuccessful or patient reports new pain     Problem: Discharge Planning  Goal: Discharge to home or other facility with appropriate resources  12/7/2024 1057 by Joselito Newell RN  Outcome: Completed  Flowsheets (Taken 12/7/2024 0815)  Discharge to home or other facility with appropriate resources:   Identify barriers to discharge with patient and caregiver   Arrange for needed discharge resources and transportation as appropriate   Identify discharge learning needs (meds, wound care, etc)     Problem: ABCDS Injury Assessment  Goal: Absence of physical injury  12/7/2024 1057 by Joselito Newell RN  Outcome: Completed     Problem: Respiratory - Adult  Goal: Achieves optimal ventilation and oxygenation  12/7/2024 1057 by Joselito Newell RN  Outcome: Completed  Flowsheets (Taken 12/7/2024 0815)  Achieves optimal ventilation and oxygenation:   Assess for changes in respiratory status   Assess for changes in mentation and behavior   Position to facilitate oxygenation and minimize respiratory effort   Oxygen supplementation based on oxygen saturation or arterial blood gases   Initiate smoking cessation protocol as indicated   Encourage broncho-pulmonary hygiene including cough, deep breathe, incentive spirometry   Assess the need for suctioning and aspirate as needed   Assess  and instruct to report shortness of breath or any respiratory difficulty   Respiratory therapy support as indicated

## 2024-12-07 NOTE — PROGRESS NOTES
Discharge instructions reviewed. All questions answered. IV removed. Agreed to follow up. All personal belongings returned. Left via private car with son. MICKEY @[phone].

## 2024-12-07 NOTE — PROGRESS NOTES
Progress Note    Patient:  Jacque Luciano  YOB: 1960     64 y.o. female    Subjective:  Patient seen and examined at bedside this morning. No new complaints or concerns per patient this morning ***. No acute issues overnight per nursing. Pain well-controlled ***. Denies: fever/chills, HA, CP, SOB, N/V, dysuria, or numbness/tingling in extremities. ***BM/***flatus. PT: ***.    Objective:   Vitals:    12/06/24 2036   BP: 119/69   Pulse: 83   Resp: 16   Temp: 98 °F (36.7 °C)   SpO2: 93%     Gen: NAD, cooperative ***  Neck/Back: ***. Appropriate post-op TTP. No godfrey-incisional induration, erythema, or fluctuance.  UE  L delt 5/5, R delt 5/5  L biceps 5/5, R biceps 5/5  L WE 5/5, R WE 5/5  L triceps 5/5, R triceps 5/5  L WF 5/5, R WF 5/5  L FF 5/5, R FF 5/5  L intrinsics 5/5, R intrinsics 5/5  Sensation intact to light touch C5-T1 bilaterally  2+ Biceps/Triceps/BR reflexes    LE  L psoas 5/5, R psoas 5/5  L quads 5/5, R quads 5/5  L TA 5/5, R TA 5/5  L EHL 5/5, R EHL 5/5  L GSC 5/5, R GSC 5/5  Sensation intact to light touch L2-S1 bilaterally  2+ Patellar & Achilles reflexes     No results for input(s): \"WBC\", \"HGB\", \"HCT\", \"PLT\", \"INR\", \"NA\", \"K\", \"BUN\", \"CREATININE\", \"GLUCOSE\", \"ESR\", \"CRP\" in the last 72 hours.    Invalid input(s): \"PT\", \"PTT\"    Meds: ***   See rec for complete list    Impression:    Plan: 64 y.o. female s/p ***, POD#***    -***  -AAT  -Complete post op Ancef  -Post op Hb ***  -Pain control: ***; avoid NSAIDs  -Tolerating PO intake well  -Voiding well  -Ice (20 min, 1 hour off) for edema/pain control  -Encourage IS and deep breathing  -DVT ppx: encourage being up and OOB & EPC, no chemical AC until POD#5  -PT/OT  -Please page Ortho with any questions    Scooter Miguel, DO  Orthopedic Spine Surgery  7:10 AM 12/7/2024

## (undated) DEVICE — COVER,MAYO STAND,STERILE: Brand: MEDLINE

## (undated) DEVICE — SUTURE VICRYL + SZ 2-0 L27IN ABSRB WHT SH 1/2 CIR TAPERCUT VCP417H

## (undated) DEVICE — DRAPE,REIN 53X77,STERILE: Brand: MEDLINE

## (undated) DEVICE — MAT FLOOR ULTRA ABS 28X48IN

## (undated) DEVICE — Device

## (undated) DEVICE — GLOVE SURG SZ 65 THK91MIL LTX FREE SYN POLYISOPRENE

## (undated) DEVICE — JACKSON TABLE POSITIONER KIT: Brand: MEDLINE INDUSTRIES, INC.

## (undated) DEVICE — BLANKET WRM W29.9XL79.1IN UP BODY FORC AIR MISTRAL-AIR

## (undated) DEVICE — CATCHER STONE TBNG ADPT SWISS LITHOCLAST

## (undated) DEVICE — INTENDED FOR TISSUE SEPARATION, AND OTHER PROCEDURES THAT REQUIRE A SHARP SURGICAL BLADE TO PUNCTURE OR CUT.: Brand: BARD-PARKER ® CARBON RIB-BACK BLADES

## (undated) DEVICE — GAUZE,SPONGE,4"X4",16PLY,STRL,LF,10/TRAY: Brand: MEDLINE

## (undated) DEVICE — 60 ML SYRINGE LUER-LOCK TIP: Brand: MONOJECT

## (undated) DEVICE — CONTAINER,SPECIMEN,4OZ,OR STRL: Brand: MEDLINE

## (undated) DEVICE — BLADE ES L4IN INSUL EDGE

## (undated) DEVICE — GUIDEWIRE URO L150CM DIA0.035IN STIFF NIT HYDRPHLC STR TIP

## (undated) DEVICE — LIQUIBAND RAPID ADHESIVE 36/CS 0.8ML: Brand: MEDLINE

## (undated) DEVICE — DRAPE C ARM UNIV W41XL74IN CLR PLAS XR VELC CLSR POLY STRP

## (undated) DEVICE — Y-TYPE TUR/BLADDER IRRIGATION SET, REGULATING CLAMP

## (undated) DEVICE — SHEET DRAPE FULL 70X100

## (undated) DEVICE — YANKAUER,POOLE TIP,STERILE,50/CS: Brand: MEDLINE

## (undated) DEVICE — STRAP,CATHETER,ELASTIC,HOOK&LOOP: Brand: MEDLINE

## (undated) DEVICE — SUTURE ETHBND EXCEL SZ 1 L30IN NONABSORBABLE GRN L36MM CT-1 X425H

## (undated) DEVICE — WAX SURG 2.5GM HEMSTAT BNE BEESWAX PARAFFIN ISO PALMITATE

## (undated) DEVICE — PROTECTOR ULN NRV PUR FOAM HK LOOP STRP ANATOMICALLY

## (undated) DEVICE — OCCLUSION BALLOON CATHETER: Brand: OCCLUDER

## (undated) DEVICE — GLOVE ORANGE PI 7 1/2   MSG9075

## (undated) DEVICE — COVER,LIGHT HANDLE,FLX,2/PK: Brand: MEDLINE INDUSTRIES, INC.

## (undated) DEVICE — CATHETER URETH 18FR BLLN 30CC 2 W F INF CTRL BARDX

## (undated) DEVICE — DUAL LUMEN URETERAL CATHETER

## (undated) DEVICE — CRANIOTOMY DRAPE, STERILE: Brand: MEDLINE

## (undated) DEVICE — TUBING AMB

## (undated) DEVICE — PROBE LITHO L403MM DIA3.8MM US SWISS LITHOCLAST

## (undated) DEVICE — SVMMC PEDS/UROLOGY MINOR PACK: Brand: MEDLINE INDUSTRIES, INC.

## (undated) DEVICE — TUBING, SUCTION, 9/32" X 20', STRAIGHT: Brand: MEDLINE INDUSTRIES, INC.

## (undated) DEVICE — CATHETER,FOLEY,100%SILICONE,16FR,10ML,LF: Brand: MEDLINE

## (undated) DEVICE — ADAPTER URO SCP UROLOK LL

## (undated) DEVICE — COVER OR TBL W40XL90IN ABSRB STD AND GRIPPY BK SAHARA

## (undated) DEVICE — ASPIRATOR FLR L72IN SUCT TB W/ 1 DETACH FISH STK PUSH HNDL

## (undated) DEVICE — MARKER,SKIN,WI/RULER AND LABELS: Brand: MEDLINE

## (undated) DEVICE — ELECTRODE PT RET AD L9FT HI MOIST COND ADH HYDRGEL CORDED

## (undated) DEVICE — GLOVE ORANGE PI 7   MSG9070

## (undated) DEVICE — MEDIUM BLADE: Brand: MILL PLUS

## (undated) DEVICE — SYRINGE, LUER LOCK, 10ML: Brand: MEDLINE

## (undated) DEVICE — DRAINBAG,ANTI-REFLUX TOWER,L/F,2000ML,LL: Brand: MEDLINE

## (undated) DEVICE — NITINOL STONE RETRIEVAL BASKET: Brand: ZERO TIP

## (undated) DEVICE — 3M™ IOBAN™ 2 ANTIMICROBIAL INCISE DRAPE 6650EZ: Brand: IOBAN™ 2

## (undated) DEVICE — CYSTO/BLADDER IRRIGATION SET, REGULATING CLAMP

## (undated) DEVICE — 4.0MM ROUND FLUTED SOFT TOUCH

## (undated) DEVICE — PROBE LITHO L570MM DIA1MM PNEUMAT SWISS LITHOCLAST

## (undated) DEVICE — BLADE ES ELASTOMERIC COAT INSUL DURABLE BEND UPTO 90DEG

## (undated) DEVICE — KIT NEPHSTMY CATH DIA7FR LINGEMAN 0.035IN SUP STIFF J DIL

## (undated) DEVICE — CODMAN® SURGICAL PATTIES 1/2" X 3" (1.27CM X 7.62CM): Brand: CODMAN®

## (undated) DEVICE — TOTAL TRAY, 16FR 10ML SIL FOLEY, URN: Brand: MEDLINE

## (undated) DEVICE — SUTURE VICRYL ABSRB BRAID COAT VLT MO-4 NO 1 27IN  J437H

## (undated) DEVICE — LIEBERMAN INTRODUCER: Brand: LIEBERMAN

## (undated) DEVICE — SUTURE VICRYL SZ 0 L36IN ABSRB UD CT-1 L36MM 1/2 CIR TAPR PNT VCP946H

## (undated) DEVICE — PACK PROCEDURE SURG CYSTO SVMMC LF

## (undated) DEVICE — GLOVE ORANGE PI 8 1/2   MSG9085

## (undated) DEVICE — GARMENT,MEDLINE,DVT,INT,CALF,MED, GEN2: Brand: MEDLINE

## (undated) DEVICE — GAUZE,SPONGE,FLUFF,6"X6.75",STRL,5/TRAY: Brand: MEDLINE

## (undated) DEVICE — STERILE POLYISOPRENE POWDER-FREE SURGICAL GLOVES WITH EMOLLIENT COATING: Brand: PROTEXIS

## (undated) DEVICE — RE-ENTRY MALECOT NEPHROSTOMY CATHETER SET: Brand: RE-ENTRY MALECOT NEPHROSTOMY CATHETER SET

## (undated) DEVICE — GLOVE ORANGE PI 8   MSG9080

## (undated) DEVICE — PAD,ABDOMINAL,5"X9",ST,LF,25/BX: Brand: MEDLINE INDUSTRIES, INC.

## (undated) DEVICE — SUTURE VICRYL SZ 4-0 L18IN ABSRB UD L19MM PS-2 3/8 CIR PRIM J496H

## (undated) DEVICE — HIGH PRESSURE NEPHROSTOMY BALLOON CATHETER KIT: Brand: NEPHROMAX KIT

## (undated) DEVICE — GOWN,AURORA,NONRNF,XL,30/CS: Brand: MEDLINE

## (undated) DEVICE — SOLUTION IRRIG 1000ML 0.9% SOD CHL USP POUR PLAS BTL

## (undated) DEVICE — CUSHION PRONEVIEW L HD NK FOAM

## (undated) DEVICE — GOWN,AURORA,NONREINFORCED,LARGE: Brand: MEDLINE